# Patient Record
Sex: FEMALE | Race: WHITE | NOT HISPANIC OR LATINO | Employment: FULL TIME | URBAN - METROPOLITAN AREA
[De-identification: names, ages, dates, MRNs, and addresses within clinical notes are randomized per-mention and may not be internally consistent; named-entity substitution may affect disease eponyms.]

---

## 2019-03-28 ENCOUNTER — OFFICE VISIT (OUTPATIENT)
Dept: RHEUMATOLOGY | Facility: CLINIC | Age: 44
End: 2019-03-28
Payer: COMMERCIAL

## 2019-03-28 VITALS — SYSTOLIC BLOOD PRESSURE: 117 MMHG | DIASTOLIC BLOOD PRESSURE: 78 MMHG | WEIGHT: 141.88 LBS

## 2019-03-28 DIAGNOSIS — Z79.899 ENCOUNTER FOR LONG-TERM (CURRENT) DRUG USE: ICD-10-CM

## 2019-03-28 DIAGNOSIS — F41.8 MIXED ANXIETY DEPRESSIVE DISORDER: ICD-10-CM

## 2019-03-28 DIAGNOSIS — Z72.0 TOBACCO ABUSE: ICD-10-CM

## 2019-03-28 DIAGNOSIS — M79.7 FIBROMYALGIA: ICD-10-CM

## 2019-03-28 DIAGNOSIS — R76.8 POSITIVE ANA (ANTINUCLEAR ANTIBODY): Primary | ICD-10-CM

## 2019-03-28 LAB
ALBUMIN SERPL-MCNC: 4.3 G/DL (ref 3.1–4.7)
ALP SERPL-CCNC: 97 IU/L (ref 40–104)
ALT (SGPT): 19 IU/L (ref 3–33)
AST SERPL-CCNC: 21 IU/L (ref 10–40)
BASOPHILS NFR BLD: 0 K/UL (ref 0–0.2)
BASOPHILS NFR BLD: 0.6 %
BILIRUB SERPL-MCNC: 0.5 MG/DL (ref 0.3–1)
BUN SERPL-MCNC: 8 MG/DL (ref 8–20)
CALCIUM SERPL-MCNC: 9.1 MG/DL (ref 7.7–10.4)
CHLORIDE: 102 MMOL/L (ref 98–110)
CK SERPL-CCNC: 36 IU/L (ref 13–135)
CO2 SERPL-SCNC: 29.7 MMOL/L (ref 22.8–31.6)
CREATININE: 0.8 MG/DL (ref 0.6–1.4)
CRP SERPL-MCNC: 0.07 MG/DL (ref 0–1.4)
EOSINOPHIL NFR BLD: 0.4 K/UL (ref 0–0.7)
EOSINOPHIL NFR BLD: 7.2 %
ERYTHROCYTE [DISTWIDTH] IN BLOOD BY AUTOMATED COUNT: 13.7 % (ref 11.7–14.9)
GLUCOSE: 84 MG/DL (ref 70–99)
GRAN #: 2.9 K/UL (ref 1.4–6.5)
GRAN%: 57.5 %
HCT VFR BLD AUTO: 43.8 % (ref 36–48)
HGB BLD-MCNC: 14.8 G/DL (ref 12–15)
IMMATURE GRANS (ABS): 0 K/UL (ref 0–1)
IMMATURE GRANULOCYTES: 0.2 %
LYMPH #: 1.3 K/UL (ref 1.2–3.4)
LYMPH%: 25.7 %
MCH RBC QN AUTO: 33 PG (ref 25–35)
MCHC RBC AUTO-ENTMCNC: 33.8 G/DL (ref 31–36)
MCV RBC AUTO: 97.6 FL (ref 79–98)
MONO #: 0.4 K/UL (ref 0.1–0.6)
MONO%: 8.8 %
NUCLEATED RBCS: 0 %
PLATELET # BLD AUTO: 182 K/UL (ref 140–440)
PMV BLD AUTO: 10.7 FL (ref 8.8–12.7)
POTASSIUM SERPL-SCNC: 4.3 MMOL/L (ref 3.5–5)
PROT SERPL-MCNC: 7.5 G/DL (ref 6–8.2)
RBC # BLD AUTO: 4.49 M/UL (ref 3.5–5.5)
SODIUM: 139 MMOL/L (ref 134–144)
TSH SERPL DL<=0.005 MIU/L-ACNC: 4.46 ULU/ML (ref 0.3–5.6)
WBC # BLD AUTO: 5 K/UL (ref 5–10)

## 2019-03-28 PROCEDURE — 99205 OFFICE O/P NEW HI 60 MIN: CPT | Mod: ,,, | Performed by: INTERNAL MEDICINE

## 2019-03-28 PROCEDURE — 99205 PR OFFICE/OUTPT VISIT, NEW, LEVL V, 60-74 MIN: ICD-10-PCS | Mod: ,,, | Performed by: INTERNAL MEDICINE

## 2019-03-28 RX ORDER — LEVOTHYROXINE SODIUM 100 UG/1
TABLET ORAL
COMMUNITY

## 2019-03-28 RX ORDER — ATENOLOL 50 MG/1
TABLET ORAL
COMMUNITY

## 2019-03-28 RX ORDER — ALPRAZOLAM 0.5 MG/1
TABLET ORAL
COMMUNITY
End: 2019-09-10

## 2019-03-28 RX ORDER — BUTALBITAL, ASPIRIN, AND CAFFEINE 325; 50; 40 MG/1; MG/1; MG/1
CAPSULE ORAL
COMMUNITY

## 2019-03-28 RX ORDER — HYDROXYCHLOROQUINE SULFATE 200 MG/1
200 TABLET, FILM COATED ORAL 2 TIMES DAILY
Qty: 60 TABLET | Refills: 5 | Status: SHIPPED | OUTPATIENT
Start: 2019-03-28 | End: 2019-09-10 | Stop reason: SDUPTHER

## 2019-03-28 RX ORDER — PAROXETINE HYDROCHLORIDE 40 MG/1
TABLET, FILM COATED ORAL
COMMUNITY
End: 2019-09-10

## 2019-03-28 NOTE — PATIENT INSTRUCTIONS
Hydroxychloroquine tablets  What is this medicine?  HYDROXYCHLOROQUINE (susan drox ee KLOR oh kwin) is used to treat rheumatoid arthritis and systemic lupus erythematosus. It is also used to treat malaria.  How should I use this medicine?  Take this medicine by mouth with a glass of water. Follow the directions on the prescription label. If this medicine upsets your stomach take it with food or milk. Take your doses at regular intervals. Do not take your medicine more often than directed.  Talk to your pediatrician regarding the use of this medicine in children. Special care may be needed.  What side effects may I notice from receiving this medicine?  Side effects that you should report to your doctor or health care professional as soon as possible:  · allergic reactions like skin rash, itching or hives, swelling of the face, lips, or tongue  · change in vision  · fever, infection  · hearing loss or ringing  · muscle weakness, tremor, or numbness  · redness, blistering, peeling or loosening of the skin, including inside the mouth  · seizures  · unusual bleeding or bruising  · unusually weak or tired  Side effects that usually do not require medical attention (report to your doctor or health care professional if they continue or are bothersome):  · change in coloration of the mouth or skin  · dizziness  · hair loss, lightening  · headache  · irritability, nervousness, nightmares  · loss of appetite  · stomach upset, diarrhea  What may interact with this medicine?  · antacids  · botulinum toxins  · digoxin  · kaolin  · penicillamine  What if I miss a dose?  If you miss a dose, take it as soon as you can. If it is almost time for your next dose, take only that dose. Do not take double or extra doses.  Where should I keep my medicine?  Keep out of the reach of children. In children, this medicine can cause overdose with small doses.  Store at room temperature between 15 and 30 degrees C (59 and 86 degrees F). Protect  from moisture and light. Throw away any unused medicine after the expiration date.  What should I tell my health care provider before I take this medicine?  They need to know if you have any of these conditions:  · alcoholism  · anemia or other blood disorder  · eye disease  · glucose 6-phosphate dehydrogenase (G6PD) deficiency  · liver disease  · porphyria  · psoriasis  · an unusual or allergic reaction to chloroquine, hydroxychloroquine, other medicines, foods, dyes, or preservatives  · pregnant or trying to get pregnant  · breast-feeding  What should I watch for while using this medicine?  Visit your doctor or health care professional for regular check ups. Tell your doctor if your symptoms do not improve. Arthritis symptoms may take several weeks to improve. If you are taking this medicine for a long time, you will need important blood work done. You will also need to have your eyes checked as directed.  This medicine can make you more sensitive to the sun. Keep out of the sun. If you cannot avoid being in the sun, wear protective clothing and use sunscreen. Do not use sun lamps or tanning beds/booths.  Avoid antacids and kaolin containing products for 2 hours before and after taking a dose of this medicine.  NOTE:This sheet is a summary. It may not cover all possible information. If you have questions about this medicine, talk to your doctor, pharmacist, or health care provider. Copyright© 2017 [employer]        Lupus (Systemic Lupus Erythematosus, SLE)  Lupus is a chronic (long-term) disease. It causes inflammation in the body. It mainly affects the joints, skin, and muscles. Lupus can affect almost any part of the body, and other common sites affected by lupus include the kidneys, blood cells, lungs, brain, nerves, intestines, eyes, mouth, and heart. Lupus is an autoimmune disease. This means that immune cells in the body begin attacking normal body cells. The cause of this is not known.  Common symptoms  include:  · A butterfly-shaped rash across the bridge of the nose and cheeks or a disk-shaped rash on the face, neck, or chest  · Sun sensitivity (a short time in the sun may lead to severe sunburn or rash)  · Stiff, painful, or swollen joints (arthritis)  · Fatigue or depression  · Fever  Your healthcare provider may prescribe medicines such as oral steroids or medicines to suppress the immune system. Some people benefit from anti-malarial medicines as well. People with lupus are more likely to have heart disease. So, it is vital to manage other risk factors for heart disease. These include high blood pressure, smoking, and unhealthy cholesterol.   There is no cure for lupus. With good care, though, most people with the condition lead normal, active lives.   Home care  · If you were prescribed a medicine, take it as directed.  · Unless another pain medicine was prescribed, take an over-the-counter pain medicine such as acetaminophen or ibuprofen for pain. Do not take ibuprofen or other NSAID (non-steroidal anti-inflammatory) medicine if you were prescribed prednisone.  · Avoid sun exposure. Cover up with clothing. Wear sunglasses. Use sun screen (at least SPF 15).  · Get enough rest and reduce stress to help your immune system.  · Get some physical activity every day. This will help you feel your best.  · If you have high blood pressure, consider buying an automatic blood pressure machine (available at most pharmacies). Use this to monitor your blood pressure and report to your doctor.  · Limit alcohol intake. Eat a healthy, balanced diet low in fat and cholesterol.  · If you smoke, quit. Smoking increases the risk of lupus-related complications.  Follow-up care  Follow up with your healthcare provider or as advised by our staff.  For more information contact the Lupus Foundation at 684-617-5303  www.lupus.org  When to seek medical advice  Call your healthcare provider for any of the following:  · Increasing  weakness, fainting  · Chest pain or shortness of breath or pain with breathing  · Severe headache with fever  · Seizures  · Leg swelling, redness or tenderness (sign of blood clot)  · Unusual bruising or bleeding anywhere on your body  · Blood in your stool (black or red color)  · Abdominal pain, repeated vomiting  · Blood or development of significant bubbles in the urine  · Swelling in the legs and arms  · Development of ulcers in the mouth  · New rash  · Rashes, discoloration or ulcerations on the finger tips or toes  · You become pregnant or are planning to become pregnant  Date Last Reviewed: 3/1/2017  © 5657-2301 [employer]. [address] [place] [address], [place] [address]. All rights reserved. This information is not intended as a substitute for professional medical care. Always follow your healthcare professional's instructions.

## 2019-03-28 NOTE — PROGRESS NOTES
[facility] RHEUMATOLOGY           New patient visit    Notes dictated via Dragon to EPIC. Please forgive any unintentional errors.  Subjective:       Patient ID:   NAME: Philly Keller : 1975     43 y.o. female    Referring Doc: No ref. provider found  Other Physicians:    Chief Complaint:  Lupus        HPI:     This young woman was referred by her nurse practitioner for the evaluation of a positive ADRIENNE. The patient was seen there for a complaint of color changes in her fingers and toes and a skin rash on her chest, abdomen and legs. She describes the color change on her digits as chronic (more than 15 years). She tells me they go from normal to white, then to hues of deep red and blue, after which they return to normal. They are sometimes numb and sometimes painful. She is uncertain as to what triggers this reaction though she does recognize that cold makes it more likely to occur. She also states that she has had a skin rash for several years now mostly involving the chest and abdomen. She characterizes this as papular, erythematous, pruritic, and not responsive to oral or topical medication. She has not seen a dermatologist and has not had a skin biopsy. This eruption does not, according to her, flare after sun exposure. Additionally, she has a chronic complaint of dry eyes and dry mouth though she has not had a corneal abrasions, acute changes in vision, mouth ulcerations or enlargement of the tongue. She has advanced dental disease and has lost all of her upper teeth and the majority of the lower zone the right. She has not had a diagnosis from her dentist; she has not seen him in more than 10 years.    She also complains to generalized muscle aching and pain. This is accompanied by fatigue, difficulty concentrating, short-term memory loss, and symptoms consistent with irritable bowel syndrome.    Her past medical history is positive for chronic anxiety and depression. She has been managed with  antidepressants, most recently Paxil at 40 mg daily, along with Xanax 0.5 mg 3 times daily. She has also been diagnosed with mitral valve prolapse with occasional palpitations thought related to that. The cardiologist that she saw started her on Tenormin 50 mg daily. She has had a diagnosis of hypothyroidism and has been on low-dose thyroid replacement for many years.      ROS:   GEN:      no fever, night sweats or weight loss  SKIN:      + rashes as above, no bruising, ++ Raynauds, no photosensitivity  HEENT:  no acute changes in vision, no mouth ulcers, + sicca symptoms, no scalp tenderness, jaw claudication.  CV:         no CP, PND, HALE or orthopnea, occ palpitations  PULM:    no SOB, cough, hemoptysis, sputum or pleuritic pain  GI:          No dysphagia, + GERD, no abdominal pain, nausea, vomiting, constipation, diarrhea, melanotic stools, BRBPR, or hematemesis.  :         Hematuria- single episode with kidney stone  NEURO: no paresthesias, + migraine headaches, no visual disturbances, muscle weakness  MUSCULOSKELETAL:  No complaints of red, hot, and/or swollen joints.  PSYCH: + insomnia, ++ anxiety & depression    Medications:    Current Outpatient Medications:     ALPRAZolam (XANAX) 0.5 MG tablet, Xanax 0.5 mg tablet  Take 1 tablet every 6 hours by oral route as needed., Disp: , Rfl:     atenolol (TENORMIN) 50 MG tablet, atenolol 50 mg tablet  Take 1 tablet every day by oral route., Disp: , Rfl:     butalbital-aspirin-caffeine -40 mg (FIORINAL) -40 mg Cap, butalbital-aspirin-caffeine 50 mg-325 mg-40 mg capsule  Take 1 - 2 capsule(s) EVERY 4 HOURS by oral route as needed for headache, Disp: , Rfl:     levothyroxine (SYNTHROID) 50 MCG tablet, Synthroid 50 mcg tablet  Take 1 tablet every day by oral route before meals for 90 days., Disp: , Rfl:     paroxetine (PAXIL) 40 MG tablet, paroxetine 40 mg tablet  Take 1 tablet every day by oral route., Disp: , Rfl:     hydroxychloroquine (PLAQUENIL)  200 mg tablet, Take 1 tablet (200 mg total) by mouth 2 (two) times daily., Disp: 60 tablet, Rfl: 5    FAMILY HISTORY: negative for Connective Tissue Disease        Review of patient's allergies indicates:   Allergen Reactions    Acetaminophen     Bleach (sodium hypochlorite)     Erythromycin Nausea Only     Other reaction(s): Vomiting    Hazelnut     Hydrocodone-acetaminophen Nausea Only             Objective:     Vitals:  Blood pressure 117/78, weight 64.4 kg (141 lb 14.4 oz).    Physical Examination:   GEN:     wn/wd female in no apparent distress  SKIN:     diffuse maculopapular lesions measuring less than 1 cm found on the anterior chest and abdomen, these are slightly raised, blanching, and apparently scarring; no sclerodactyly, no active Raynaud's, no periungual erythema, no digital tip tapering, no nailbed pitting. Moderate livedo reticularis is noted on the lower extremities  HEAD:   no alopecia, no scalp tenderness, no temporal artery tenderness or induration.  EYES:   no pallor, no icterus, PERRLA  ENT:     no thrush, no mucosal dryness or ulcerations, adequate oral hygiene, poor dentition.  NECK:  supple x 6, no masses, no thyromegaly, no lymphadenopathy.  CV:        S1 and S2 regular, II/VI ejection murmur LUSB, no gallop or rub  CHEST: Normal respiratory effort; normal breath sounds/no adventitious sounds. No signs of consolidation.  ABD:      non-tender and non-distended; soft; normal bowel sounds; no rebound, guarding, or tenderness. No hepatosplenomegaly.  Musculoskeletal:  No evidence of active inflammatory arthritis or any significant degenerative change  EXTREM: no clubbing, cyanosis or edema. normal pulses.  NEURO:  grossly intact; motor/sensory WNL; no tremors  PSYCH:   Anxious, initially tremulous, able to focus, oriented x 3, not psychotic, not suicidal            Labs:   No results found for: WBC, HGB, HCT, MCV, PLTCMP@  No results found for: NA, K, CL, CO2, GLU, BUN, CREATININE,  CALCIUM, PROT, ALBUMIN, BILITOT, ALKPHOS, AST, ALT, CPK, CRP, ADRIENNE, RF, URICACID      Radiology/Diagnostic Studies:    ADRIENNE (qualitative) +. No titer or pattern. SSA & SSB +    Assessment/Discussion/Plan:   43 y.o. female with positive antinuclear antibody with skin rash, sicca symptoms, and Raynaud's- likely systemic lupus erythematosus  2) Fibromyalgia secondary to underlying anxiety-depressive disorder-treated currently with Paxil 40 mg daily and Xanax as needed    PLAN:  I have discussed the diagnosis with her in detail. I have indicated that the antinuclear antibody--even without titers--is significant given the high positivity of the anti-SSA. I have explained that the Raynaud's phenomenon is often secondary to a broader autoimmune disease such as lupus. Additionally, while I do not see findings consistent with Sjogren's, her symptoms are significant and may suggest the beginning of secondary Sjogren's. Literature on lupus was provided.    I believe it is appropriate at this time to begin treatment. I have discussed options with her and we have agreed on Plaquenil 200 mg twice daily. I have explained how this medication works and what the side effects are. I have asked her to see the ophthalmologist within the next month for a baseline examination. I provided her with printed literature on hydroxychloroquine.    I have discussed the need for sun avoidance with her. She does not tolerate sun-block well as it makes her skin burn. I suggested she try different varieties and that failing in that effort, she should use a broad brimmed hat and long sleeves and long pants to the extent possible. We also went into detail about smoking cessation. Given the risk of lung disease with lupus, it is critical that she stop smoking now.     I have obtained blood testing today including an antinuclear antibody titer and pattern. Baseline CBC and CMP were also obtained as was a urinalysis. Additionally, a CPK, CRP and TSH were  ordered.    I will not address the fibromyalgia in detail today though I have explained the diagnosis and commented generally that Paxil and Xanax are appropriate treatments. I will go more into depth at the next visit and I may consider referral to psychology.      RTC:  I will see her back in 2 months or sooner if needed      Electronically signed by Osbaldo Osborne MD

## 2019-05-23 ENCOUNTER — OFFICE VISIT (OUTPATIENT)
Dept: RHEUMATOLOGY | Facility: CLINIC | Age: 44
End: 2019-05-23
Payer: COMMERCIAL

## 2019-05-23 VITALS — SYSTOLIC BLOOD PRESSURE: 123 MMHG | DIASTOLIC BLOOD PRESSURE: 78 MMHG | WEIGHT: 142.5 LBS

## 2019-05-23 DIAGNOSIS — Z79.899 ENCOUNTER FOR LONG-TERM (CURRENT) DRUG USE: ICD-10-CM

## 2019-05-23 DIAGNOSIS — R76.8 POSITIVE ANA (ANTINUCLEAR ANTIBODY): Primary | ICD-10-CM

## 2019-05-23 DIAGNOSIS — M35.9 UNDIFFERENTIATED CONNECTIVE TISSUE DISEASE: ICD-10-CM

## 2019-05-23 DIAGNOSIS — Z72.0 TOBACCO ABUSE: ICD-10-CM

## 2019-05-23 DIAGNOSIS — M79.7 FIBROMYALGIA: ICD-10-CM

## 2019-05-23 LAB
ALBUMIN SERPL-MCNC: 4.2 G/DL (ref 3.1–4.7)
ALP SERPL-CCNC: 93 IU/L (ref 40–104)
ALT (SGPT): 14 IU/L (ref 3–33)
AST SERPL-CCNC: 21 IU/L (ref 10–40)
BASOPHILS NFR BLD: 0.1 K/UL (ref 0–0.2)
BASOPHILS NFR BLD: 0.8 %
BILIRUB SERPL-MCNC: 0.4 MG/DL (ref 0.3–1)
BUN SERPL-MCNC: 10 MG/DL (ref 8–20)
CALCIUM SERPL-MCNC: 9 MG/DL (ref 7.7–10.4)
CHLORIDE: 101 MMOL/L (ref 98–110)
CO2 SERPL-SCNC: 30 MMOL/L (ref 22.8–31.6)
CREATININE: 0.86 MG/DL (ref 0.6–1.4)
CRP SERPL-MCNC: 0.09 MG/DL (ref 0–1.4)
EOSINOPHIL NFR BLD: 0.5 K/UL (ref 0–0.7)
EOSINOPHIL NFR BLD: 8.5 %
ERYTHROCYTE [DISTWIDTH] IN BLOOD BY AUTOMATED COUNT: 13.5 % (ref 11.7–14.9)
GLUCOSE: 86 MG/DL (ref 70–99)
GRAN #: 3.6 K/UL (ref 1.4–6.5)
GRAN%: 59.9 %
HCT VFR BLD AUTO: 39.8 % (ref 36–48)
HGB BLD-MCNC: 13.5 G/DL (ref 12–15)
IMMATURE GRANS (ABS): 0 K/UL (ref 0–1)
IMMATURE GRANULOCYTES: 0.3 %
LYMPH #: 1.2 K/UL (ref 1.2–3.4)
LYMPH%: 20.8 %
MCH RBC QN AUTO: 32.6 PG (ref 25–35)
MCHC RBC AUTO-ENTMCNC: 33.9 G/DL (ref 31–36)
MCV RBC AUTO: 96.1 FL (ref 79–98)
MONO #: 0.6 K/UL (ref 0.1–0.6)
MONO%: 9.7 %
NUCLEATED RBCS: 0 %
PLATELET # BLD AUTO: 190 K/UL (ref 140–440)
PMV BLD AUTO: 10.9 FL (ref 8.8–12.7)
POTASSIUM SERPL-SCNC: 4.1 MMOL/L (ref 3.5–5)
PROT SERPL-MCNC: 7.2 G/DL (ref 6–8.2)
RBC # BLD AUTO: 4.14 M/UL (ref 3.5–5.5)
SODIUM: 138 MMOL/L (ref 134–144)
WBC # BLD AUTO: 6 K/UL (ref 5–10)

## 2019-05-23 PROCEDURE — 99214 OFFICE O/P EST MOD 30 MIN: CPT | Mod: ,,, | Performed by: INTERNAL MEDICINE

## 2019-05-23 PROCEDURE — 99214 PR OFFICE/OUTPT VISIT, EST, LEVL IV, 30-39 MIN: ICD-10-PCS | Mod: ,,, | Performed by: INTERNAL MEDICINE

## 2019-05-23 RX ORDER — IBUPROFEN 200 MG
200 TABLET ORAL EVERY 6 HOURS PRN
COMMUNITY

## 2019-05-23 RX ORDER — CYCLOBENZAPRINE HCL 10 MG
TABLET ORAL
COMMUNITY
Start: 2019-04-28 | End: 2021-06-23

## 2019-05-23 NOTE — PROGRESS NOTES
[facility] RHEUMATOLOGY           Follow-up visit    Notes dictated via Dragon to EPIC. Please forgive any unintended errors.  Subjective:       Patient ID:   NAME: Philly Keller : 1975     44 y.o. female    Referring Doc: No ref. provider found  Other Physicians:    Chief Complaint:  Positive ADRIENNE      HPI/Interval History:   The patient is doing well. She has had no difficulty tolerating hydroxychloroquine. She has had no complaint of hair loss, skin rash, photosensitivity, increased sicca symptoms, oral ulcerations, chest pain or shortness of breath, Raynaud's, or arthritis.          ROS:   GEN:    no fever, night sweats or weight loss  SKIN:   no rashes , erythema, bruising, or swelling, no Raynauds, no photosensitivity  HEENT: no changes in vision, no mouth ulcers, no sicca symptoms, no scalp tenderness, no jaw claudication.  CV:      no CP, PND, HALE or orthopnea, no palpitations  PULM: no SOB, cough, hemoptysis, sputum or pleuritic pain  GI:       no GERD, no dysphagia, no abdominal pain, nausea, vomiting, constipation, diarrhea, melanotic stools, BRBPR, or hematemesis  :      no hematuria, dysuria  NEURO: no paresthesias, headaches, acute visual disturbances  MUSCULOSKELETAL:  No muscle or joint complaints  PSYCH:   No increased insomnia, no increased anxiety or depression    Past Medical/Surgical History:  Past Medical History:   Diagnosis Date    Anxiety     Depression     Hypertension     Mitral valve prolapse     Raynaud disease     Sjogren's syndrome     Thyroid disease      Past Surgical History:   Procedure Laterality Date    MULTIPLE TOOTH EXTRACTIONS      TONSILLECTOMY      WISDOM TOOTH EXTRACTION         Allergies:  Review of patient's allergies indicates:   Allergen Reactions    Acetaminophen     Bleach (sodium hypochlorite)     Erythromycin Nausea Only     Other reaction(s): Vomiting    Hazelnut     Hydrocodone-acetaminophen Nausea Only       Social/Family History:  Social  History     Socioeconomic History    Marital status:      Spouse name: Not on file    Number of children: Not on file    Years of education: Not on file    Highest education level: Not on file   Occupational History    Not on file   Social Needs    Financial resource strain: Not on file    Food insecurity:     Worry: Not on file     Inability: Not on file    Transportation needs:     Medical: Not on file     Non-medical: Not on file   Tobacco Use    Smoking status: Current Every Day Smoker     Types: Cigarettes, Vaping w/o nicotine    Smokeless tobacco: Never Used   Substance and Sexual Activity    Alcohol use: Yes     Comment: Very Rarely    Drug use: Never    Sexual activity: Not on file   Lifestyle    Physical activity:     Days per week: Not on file     Minutes per session: Not on file    Stress: Not on file   Relationships    Social connections:     Talks on phone: Not on file     Gets together: Not on file     Attends [employer] service: Not on file     Active member of club or organization: Not on file     Attends meetings of clubs or organizations: Not on file     Relationship status: Not on file   Other Topics Concern    Not on file   Social History Narrative    Not on file     Family History   Problem Relation Age of Onset    Hypertension Mother     Mitral valve prolapse Father     No Known Problems Sister     Heart attack Brother     No Known Problems Daughter     No Known Problems Son     Diabetes Mellitus Maternal Aunt         Multiple    No Known Problems Maternal Uncle     Cancer Paternal Aunt     Cancer Paternal Uncle     Hypertension Maternal Grandmother     Alzheimer's disease Maternal Grandmother     Heart attack Maternal Grandfather     Parkinsonism Paternal Grandmother     No Known Problems Paternal Grandfather      FAMILY HISTORY: negative for Connective Tissue Disease      Medications:    Current Outpatient Medications:     ALPRAZolam (XANAX) 0.5 MG  tablet, Xanax 0.5 mg tablet  Take 1 tablet every 6 hours by oral route as needed., Disp: , Rfl:     atenolol (TENORMIN) 50 MG tablet, atenolol 50 mg tablet  Take 1 tablet every day by oral route., Disp: , Rfl:     butalbital-aspirin-caffeine -40 mg (FIORINAL) -40 mg Cap, butalbital-aspirin-caffeine 50 mg-325 mg-40 mg capsule  Take 1 - 2 capsule(s) EVERY 4 HOURS by oral route as needed for headache, Disp: , Rfl:     cyclobenzaprine (FLEXERIL) 10 MG tablet, , Disp: , Rfl:     hydroxychloroquine (PLAQUENIL) 200 mg tablet, Take 1 tablet (200 mg total) by mouth 2 (two) times daily., Disp: 60 tablet, Rfl: 5    ibuprofen (ADVIL,MOTRIN) 200 MG tablet, Take 200 mg by mouth every 6 (six) hours as needed for Pain., Disp: , Rfl:     levothyroxine (SYNTHROID) 50 MCG tablet, Synthroid 50 mcg tablet  Take 1 tablet every day by oral route before meals for 90 days., Disp: , Rfl:     paroxetine (PAXIL) 40 MG tablet, paroxetine 40 mg tablet  Take 1 tablet every day by oral route., Disp: , Rfl:         Objective:     Vitals:  Blood pressure 123/78, weight 64.6 kg (142 lb 8 oz).    Physical Examination:   GEN: wn/wd female in no apparent distress  SKIN: no rashes, no sclerodactyly, no Raynaud's, no periungual erythema, no digital tip ulcerations, no nailbed pitting  HEAD: no alopecia, no scalp tenderness, no temporal artery tenderness or induration.  EYES: no pallor, no icterus, PERRLA  ENT:  no thrush, no mucosal dryness or ulcerations, adequate oral hygiene & dentition.  NECK: supple x 6, no masses, no thyromegaly, no lymphadenopathy.  CV:   S1 and S2 regular, no murmurs, gallop or rubs  CHEST: Normal respiratory effort;  normal breath sounds/no adventitious sounds. No signs of consolidation.  ABD: non-tender and non-distended; soft; normal bowel sounds; no rebound/guarding or tenderness. No hepatosplenomegaly.  Musculoskeletal:  No evidence of inflammatory arthritis  EXTREM: no clubbing, cyanosis or edema. normal  pulses.  NEURO:  grossly intact; motor/sensory WNL; no tremors  PSYCH:   Anxious, pressured speech, able to focus, oriented x 3, non-psychotic            Labs:   Lab Results   Component Value Date    WBC 6.0 05/23/2019    HGB 13.5 05/23/2019    HCT 39.8 05/23/2019    MCV 96.1 05/23/2019     05/23/2019   CMP@  Sodium   Date Value Ref Range Status   05/23/2019 138 134 - 144 mmol/L      Potassium   Date Value Ref Range Status   05/23/2019 4.1 3.5 - 5.0 mmol/L      Chloride   Date Value Ref Range Status   05/23/2019 101 98 - 110 mmol/L      CO2   Date Value Ref Range Status   05/23/2019 30.0 22.8 - 31.6 mmol/L      Glucose   Date Value Ref Range Status   05/23/2019 86 70 - 99 mg/dL      BUN, Bld   Date Value Ref Range Status   05/23/2019 10 8 - 20 mg/dL      Creatinine   Date Value Ref Range Status   05/23/2019 0.86 0.60 - 1.40 mg/dL      Calcium   Date Value Ref Range Status   05/23/2019 9.0 7.7 - 10.4 mg/dL      Total Protein   Date Value Ref Range Status   05/23/2019 7.2 6.0 - 8.2 g/dL      Albumin   Date Value Ref Range Status   05/23/2019 4.2 3.1 - 4.7 g/dL      Total Bilirubin   Date Value Ref Range Status   05/23/2019 0.4 0.3 - 1.0 mg/dL      Alkaline Phosphatase   Date Value Ref Range Status   05/23/2019 93 40 - 104 IU/L      AST   Date Value Ref Range Status   05/23/2019 21 10 - 40 IU/L      CPK   Date Value Ref Range Status   03/28/2019 36 13 - 135 IU/L      CRP   Date Value Ref Range Status   05/23/2019 0.09 0.00 - 1.40 mg/dL          Radiology/Diagnostic Studies:    None    Assessment/Discussion/Plan:   44 y.o. female with UCTD- stable on hydroxychloroquine 400 mg daily  2) fibromyalgia secondary to underlying anxiety-depressive disorder  3) tobacco abuse    PLAN:  I will continue her hydroxychloroquine treatment. Routine follow-up blood testing was obtained.  I have reminded her that an eye examination is required within the next month for the Plaquenil examination.  Her anxiety-depressive  "medications are being handled by her primary provider and occasionally by mental health.  I have touched upon the tobacco abuse question again. She states she is "only smoking 3-4 cigarettes per day."    RTC:  I will see her back in 3 months      Electronically signed by Osblado Osborne MD                  "

## 2019-05-23 NOTE — PATIENT INSTRUCTIONS
Hydroxychloroquine tablets  What is this medicine?  HYDROXYCHLOROQUINE (susan drox ee KLOR oh kwin) is used to treat rheumatoid arthritis and systemic lupus erythematosus. It is also used to treat malaria.  How should I use this medicine?  Take this medicine by mouth with a glass of water. Follow the directions on the prescription label. If this medicine upsets your stomach take it with food or milk. Take your doses at regular intervals. Do not take your medicine more often than directed.  Talk to your pediatrician regarding the use of this medicine in children. Special care may be needed.  What side effects may I notice from receiving this medicine?  Side effects that you should report to your doctor or health care professional as soon as possible:  · allergic reactions like skin rash, itching or hives, swelling of the face, lips, or tongue  · change in vision  · fever, infection  · hearing loss or ringing  · muscle weakness, tremor, or numbness  · redness, blistering, peeling or loosening of the skin, including inside the mouth  · seizures  · unusual bleeding or bruising  · unusually weak or tired  Side effects that usually do not require medical attention (report to your doctor or health care professional if they continue or are bothersome):  · change in coloration of the mouth or skin  · dizziness  · hair loss, lightening  · headache  · irritability, nervousness, nightmares  · loss of appetite  · stomach upset, diarrhea  What may interact with this medicine?  · antacids  · botulinum toxins  · digoxin  · kaolin  · penicillamine  What if I miss a dose?  If you miss a dose, take it as soon as you can. If it is almost time for your next dose, take only that dose. Do not take double or extra doses.  Where should I keep my medicine?  Keep out of the reach of children. In children, this medicine can cause overdose with small doses.  Store at room temperature between 15 and 30 degrees C (59 and 86 degrees F). Protect  from moisture and light. Throw away any unused medicine after the expiration date.  What should I tell my health care provider before I take this medicine?  They need to know if you have any of these conditions:  · alcoholism  · anemia or other blood disorder  · eye disease  · glucose 6-phosphate dehydrogenase (G6PD) deficiency  · liver disease  · porphyria  · psoriasis  · an unusual or allergic reaction to chloroquine, hydroxychloroquine, other medicines, foods, dyes, or preservatives  · pregnant or trying to get pregnant  · breast-feeding  What should I watch for while using this medicine?  Visit your doctor or health care professional for regular check ups. Tell your doctor if your symptoms do not improve. Arthritis symptoms may take several weeks to improve. If you are taking this medicine for a long time, you will need important blood work done. You will also need to have your eyes checked as directed.  This medicine can make you more sensitive to the sun. Keep out of the sun. If you cannot avoid being in the sun, wear protective clothing and use sunscreen. Do not use sun lamps or tanning beds/booths.  Avoid antacids and kaolin containing products for 2 hours before and after taking a dose of this medicine.  NOTE:This sheet is a summary. It may not cover all possible information. If you have questions about this medicine, talk to your doctor, pharmacist, or health care provider. Copyright© 2017 [employer]

## 2019-09-10 ENCOUNTER — OFFICE VISIT (OUTPATIENT)
Dept: RHEUMATOLOGY | Facility: CLINIC | Age: 44
End: 2019-09-10
Payer: COMMERCIAL

## 2019-09-10 VITALS — DIASTOLIC BLOOD PRESSURE: 81 MMHG | WEIGHT: 149.81 LBS | SYSTOLIC BLOOD PRESSURE: 133 MMHG

## 2019-09-10 DIAGNOSIS — M35.9 UNDIFFERENTIATED CONNECTIVE TISSUE DISEASE: ICD-10-CM

## 2019-09-10 DIAGNOSIS — F41.8 MIXED ANXIETY DEPRESSIVE DISORDER: Primary | ICD-10-CM

## 2019-09-10 DIAGNOSIS — Z72.0 TOBACCO ABUSE: ICD-10-CM

## 2019-09-10 DIAGNOSIS — R76.8 POSITIVE ANA (ANTINUCLEAR ANTIBODY): ICD-10-CM

## 2019-09-10 PROCEDURE — 99213 PR OFFICE/OUTPT VISIT, EST, LEVL III, 20-29 MIN: ICD-10-PCS | Mod: S$GLB,,, | Performed by: INTERNAL MEDICINE

## 2019-09-10 PROCEDURE — 99213 OFFICE O/P EST LOW 20 MIN: CPT | Mod: S$GLB,,, | Performed by: INTERNAL MEDICINE

## 2019-09-10 RX ORDER — SERTRALINE HYDROCHLORIDE 100 MG/1
100 TABLET, FILM COATED ORAL DAILY
Refills: 3 | COMMUNITY
Start: 2019-08-22 | End: 2021-06-23

## 2019-09-10 RX ORDER — HYDROXYCHLOROQUINE SULFATE 200 MG/1
200 TABLET, FILM COATED ORAL 2 TIMES DAILY
Qty: 60 TABLET | Refills: 5 | Status: SHIPPED | OUTPATIENT
Start: 2019-09-10 | End: 2020-04-21 | Stop reason: SDUPTHER

## 2019-09-10 RX ORDER — NITROFURANTOIN (MACROCRYSTALS) 100 MG/1
CAPSULE ORAL
COMMUNITY

## 2019-09-10 RX ORDER — CLONAZEPAM 1 MG/1
1 TABLET ORAL 2 TIMES DAILY PRN
Refills: 2 | COMMUNITY
Start: 2019-08-22 | End: 2020-08-05 | Stop reason: SDUPTHER

## 2019-09-10 NOTE — PROGRESS NOTES
[facility] RHEUMATOLOGY           Follow-up visit    Notes dictated via Dragon to EPIC. Please forgive any unintended errors.  Subjective:       Patient ID:   NAME: Philly Keller : 1975     44 y.o. female    Referring Doc: No ref. provider found  Other Physicians:    Chief Complaint:  Positive ADRIENNE      HPI/Interval History:   The patient is doing reasonably well.  She has no complaints of active skin rash, increased sicca symptoms, oral ulcerations, chest pain or shortness of breath, or active Raynaud's.  She has had pain in her hands but has not had any red, hot, and swollen joints.  Her nurse practitioner has changed her antidepressant medications.  She is not certain whether this has been of benefit.  She is currently taking Zoloft 100 mg once daily and Klonopin 0.25 mg 4 times daily.  Insomnia is a problem.    ROS:   GEN:    no fever, night sweats or weight loss  SKIN:   no rashes, erythema, bruising, or swelling, no Raynauds, no photosensitivity  HEENT: no changes in vision, no mouth ulcers, no sicca symptoms, no scalp tenderness, no jaw claudication.  CV:      no CP, PND, HALE, orthopnea, no palpitations  PULM: no SOB, cough, hemoptysis, sputum or pleuritic pain  GI:       no GERD, no dysphagia, no hematemesis, no abdominal pain, nausea, vomiting, constipation, diarrhea, melanotic stools, BRBPR  :      no hematuria, dysuria  NEURO: no paresthesias, headaches, acute visual disturbances  MUSCULOSKELETAL:  Stiffness and pain in the hands without redness, warmth, or swelling  PSYCH:   + insomnia, no increased anxiety, no increased depression    Past Medical/Surgical History:  Past Medical History:   Diagnosis Date    Anxiety     Depression     Hypertension     Mitral valve prolapse     Raynaud disease     Sjogren's syndrome     Thyroid disease      Past Surgical History:   Procedure Laterality Date    MULTIPLE TOOTH EXTRACTIONS      TONSILLECTOMY      WISDOM TOOTH EXTRACTION          Allergies:  Review of patient's allergies indicates:   Allergen Reactions    Acetaminophen     Bleach (sodium hypochlorite)     Erythromycin Nausea Only     Other reaction(s): Vomiting    Hazelnut     Hydrocodone-acetaminophen Nausea Only       Social/Family History:  Social History     Socioeconomic History    Marital status:      Spouse name: Not on file    Number of children: Not on file    Years of education: Not on file    Highest education level: Not on file   Occupational History    Not on file   Social Needs    Financial resource strain: Not on file    Food insecurity:     Worry: Not on file     Inability: Not on file    Transportation needs:     Medical: Not on file     Non-medical: Not on file   Tobacco Use    Smoking status: Current Every Day Smoker     Types: Cigarettes, Vaping w/o nicotine    Smokeless tobacco: Never Used   Substance and Sexual Activity    Alcohol use: Yes     Comment: Very Rarely    Drug use: Never    Sexual activity: Not on file   Lifestyle    Physical activity:     Days per week: Not on file     Minutes per session: Not on file    Stress: Not on file   Relationships    Social connections:     Talks on phone: Not on file     Gets together: Not on file     Attends Faith service: Not on file     Active member of club or organization: Not on file     Attends meetings of clubs or organizations: Not on file     Relationship status: Not on file   Other Topics Concern    Not on file   Social History Narrative    Not on file     Family History   Problem Relation Age of Onset    Hypertension Mother     Mitral valve prolapse Father     No Known Problems Sister     Heart attack Brother     No Known Problems Daughter     No Known Problems Son     Diabetes Mellitus Maternal Aunt         Multiple    No Known Problems Maternal Uncle     Cancer Paternal Aunt     Cancer Paternal Uncle     Hypertension Maternal Grandmother     Alzheimer's disease  Maternal Grandmother     Heart attack Maternal Grandfather     Parkinsonism Paternal Grandmother     No Known Problems Paternal Grandfather      FAMILY HISTORY: negative for Connective Tissue Disease      Medications:    Current Outpatient Medications:     atenolol (TENORMIN) 50 MG tablet, atenolol 50 mg tablet  Take 1 tablet every day by oral route., Disp: , Rfl:     butalbital-aspirin-caffeine -40 mg (FIORINAL) -40 mg Cap, butalbital-aspirin-caffeine 50 mg-325 mg-40 mg capsule  Take 1 - 2 capsule(s) EVERY 4 HOURS by oral route as needed for headache, Disp: , Rfl:     clonazePAM (KLONOPIN) 1 MG tablet, Take 1 mg by mouth 2 (two) times daily as needed., Disp: , Rfl: 2    cyclobenzaprine (FLEXERIL) 10 MG tablet, , Disp: , Rfl:     hydroxychloroquine (PLAQUENIL) 200 mg tablet, Take 1 tablet (200 mg total) by mouth 2 (two) times daily., Disp: 60 tablet, Rfl: 5    ibuprofen (ADVIL,MOTRIN) 200 MG tablet, Take 200 mg by mouth every 6 (six) hours as needed for Pain., Disp: , Rfl:     levothyroxine (SYNTHROID) 50 MCG tablet, Synthroid 50 mcg tablet  Take 1 tablet every day by oral route before meals for 90 days., Disp: , Rfl:     nitrofurantoin (MACRODANTIN) 100 MG capsule, nitrofurantoin macrocrystal 100 mg capsule  Take 1 capsule every day by oral route at bedtime for 30 days., Disp: , Rfl:     sertraline (ZOLOFT) 100 MG tablet, Take 100 mg by mouth once daily., Disp: , Rfl: 3      Objective:     Vitals:  Blood pressure 133/81, weight 67.9 kg (149 lb 12.8 oz).    Physical Examination:   GEN: wn/wd female in no apparent distress  SKIN: no rashes, no sclerodactyly, no Raynaud's, no periungual erythema, no digital tip ulcerations, no nailbed pitting  HEAD: no alopecia, no scalp tenderness, no temporal artery tenderness or induration.  EYES: no pallor, no icterus, PERRLA  ENT:  no thrush, no mucosal dryness or ulcerations, adequate oral hygiene & dentition.  NECK: supple x 6, no masses, no thyromegaly,  no lymphadenopathy.  CV:   S1 and S2 regular, no murmurs, gallop or rubs  CHEST: Normal respiratory effort;  normal breath sounds/no adventitious sounds. No signs of consolidation.  ABD: non-tender and non-distended; soft; normal bowel sounds; no rebound/guarding or tenderness. No hepatosplenomegaly.  Musculoskeletal:  No evidence of active inflammatory arthritis.  No progressive deformity  EXTREM: no clubbing, cyanosis or edema. normal pulses.  NEURO:  grossly intact; motor/sensory WNL; no tremors  PSYCH:  normal mood, affect and behavior    Labs:   Lab Results   Component Value Date    WBC 6.0 05/23/2019    HGB 13.5 05/23/2019    HCT 39.8 05/23/2019    MCV 96.1 05/23/2019     05/23/2019   CMP@  Sodium   Date Value Ref Range Status   05/23/2019 138 134 - 144 mmol/L      Potassium   Date Value Ref Range Status   05/23/2019 4.1 3.5 - 5.0 mmol/L      Chloride   Date Value Ref Range Status   05/23/2019 101 98 - 110 mmol/L      CO2   Date Value Ref Range Status   05/23/2019 30.0 22.8 - 31.6 mmol/L      Glucose   Date Value Ref Range Status   05/23/2019 86 70 - 99 mg/dL      BUN, Bld   Date Value Ref Range Status   05/23/2019 10 8 - 20 mg/dL      Creatinine   Date Value Ref Range Status   05/23/2019 0.86 0.60 - 1.40 mg/dL      Calcium   Date Value Ref Range Status   05/23/2019 9.0 7.7 - 10.4 mg/dL      Total Protein   Date Value Ref Range Status   05/23/2019 7.2 6.0 - 8.2 g/dL      Albumin   Date Value Ref Range Status   05/23/2019 4.2 3.1 - 4.7 g/dL      Total Bilirubin   Date Value Ref Range Status   05/23/2019 0.4 0.3 - 1.0 mg/dL      Alkaline Phosphatase   Date Value Ref Range Status   05/23/2019 93 40 - 104 IU/L      AST   Date Value Ref Range Status   05/23/2019 21 10 - 40 IU/L      CPK   Date Value Ref Range Status   03/28/2019 36 13 - 135 IU/L      CRP   Date Value Ref Range Status   05/23/2019 0.09 0.00 - 1.40 mg/dL        Radiology/Diagnostic Studies:    None    Assessment/Discussion/Plan:   44 y.o.  female with UCTD-stable on hydroxychloroquine 400 mg daily  2) fibromyalgia secondary to underlying anxiety-depressive disorder  3) continuous tobacco abuse    PLAN:  I will continue her medication unchanged.  I rediscussed the meaning of her current UCTD diagnosis.  I also explained the role of hydroxychloroquine.  I suggested that she speak with her prescriber about her Zoloft and Klonopin.  I believe she would get a much better response to Zoloft 100 mg twice daily and to the entire 1 mg of Klonopin nightly for insomnia.  I have ordered appropriate blood and urine testing.    RTC:  Mann see her back in 4 months.    Electronically signed by Osbaldo Osborne MD

## 2020-01-09 ENCOUNTER — OFFICE VISIT (OUTPATIENT)
Dept: RHEUMATOLOGY | Facility: CLINIC | Age: 45
End: 2020-01-09
Payer: COMMERCIAL

## 2020-01-09 VITALS — DIASTOLIC BLOOD PRESSURE: 86 MMHG | SYSTOLIC BLOOD PRESSURE: 138 MMHG | WEIGHT: 144.31 LBS

## 2020-01-09 DIAGNOSIS — Z79.899 ENCOUNTER FOR LONG-TERM (CURRENT) DRUG USE: ICD-10-CM

## 2020-01-09 DIAGNOSIS — M35.9 UNDIFFERENTIATED CONNECTIVE TISSUE DISEASE: ICD-10-CM

## 2020-01-09 DIAGNOSIS — M79.7 FIBROMYALGIA: Primary | ICD-10-CM

## 2020-01-09 PROCEDURE — 99214 PR OFFICE/OUTPT VISIT, EST, LEVL IV, 30-39 MIN: ICD-10-PCS | Mod: S$GLB,,, | Performed by: INTERNAL MEDICINE

## 2020-01-09 PROCEDURE — 99214 OFFICE O/P EST MOD 30 MIN: CPT | Mod: S$GLB,,, | Performed by: INTERNAL MEDICINE

## 2020-01-09 NOTE — PROGRESS NOTES
[facility] RHEUMATOLOGY           Follow-up visit    Notes dictated to M*Modal. Please forgive any unintended errors.  Subjective:       Patient ID:   NAME: Philly Keller : 1975     44 y.o. female    Referring Doc: No ref. provider found  Other Physicians:    Chief Complaint:  Positive ADRIENNE      HPI/Interval History:  The patient is doing well.  She has no complaints of skin rash.  She has had no issues with joint pain or swelling.  She has found that having rearranged her Klonopin to 1 mg at bedtime and increasing her Zoloft to 100 twice daily as improved her moods and her sleep.  She is very pleased.    ROS:   GEN:    no fever, night sweats or weight loss  SKIN:   no rashes, erythema, bruising, or swelling, no Raynauds, no photosensitivity  HEENT: no changes in vision, no mouth ulcers, no sicca symptoms, no scalp tenderness, no jaw claudication.  CV:      no CP, PND, HALE, orthopnea, no palpitations  PULM: no SOB, cough, hemoptysis, sputum or pleuritic pain  GI:       no GERD, no dysphagia, no hematemesis, no abdominal pain, nausea, vomiting, constipation, diarrhea, melanotic stools, BRBPR  :      no hematuria, dysuria  NEURO: no paresthesias, headaches, acute visual disturbances  MUSCULOSKELETAL:  No muscle or joint pain  PSYCH:   No increased insomnia, no increased anxiety, no increased depression    Past Medical/Surgical History:  Past Medical History:   Diagnosis Date    Anxiety     Depression     Hypertension     Mitral valve prolapse     Raynaud disease     Sjogren's syndrome     Thyroid disease      Past Surgical History:   Procedure Laterality Date    MULTIPLE TOOTH EXTRACTIONS      TONSILLECTOMY      WISDOM TOOTH EXTRACTION         Allergies:  Review of patient's allergies indicates:   Allergen Reactions    Acetaminophen     Bleach (sodium hypochlorite)     Erythromycin Nausea Only     Other reaction(s): Vomiting    Hazelnut     Hydrocodone-acetaminophen Nausea Only        Social/Family History:  Social History     Socioeconomic History    Marital status:      Spouse name: Not on file    Number of children: Not on file    Years of education: Not on file    Highest education level: Not on file   Occupational History    Not on file   Social Needs    Financial resource strain: Not on file    Food insecurity:     Worry: Not on file     Inability: Not on file    Transportation needs:     Medical: Not on file     Non-medical: Not on file   Tobacco Use    Smoking status: Current Every Day Smoker     Types: Cigarettes, Vaping w/o nicotine    Smokeless tobacco: Never Used   Substance and Sexual Activity    Alcohol use: Yes     Comment: Very Rarely    Drug use: Never    Sexual activity: Not on file   Lifestyle    Physical activity:     Days per week: Not on file     Minutes per session: Not on file    Stress: Not on file   Relationships    Social connections:     Talks on phone: Not on file     Gets together: Not on file     Attends Anglican service: Not on file     Active member of club or organization: Not on file     Attends meetings of clubs or organizations: Not on file     Relationship status: Not on file   Other Topics Concern    Not on file   Social History Narrative    Not on file     Family History   Problem Relation Age of Onset    Hypertension Mother     Mitral valve prolapse Father     No Known Problems Sister     Heart attack Brother     No Known Problems Daughter     No Known Problems Son     Diabetes Mellitus Maternal Aunt         Multiple    No Known Problems Maternal Uncle     Cancer Paternal Aunt     Cancer Paternal Uncle     Hypertension Maternal Grandmother     Alzheimer's disease Maternal Grandmother     Heart attack Maternal Grandfather     Parkinsonism Paternal Grandmother     No Known Problems Paternal Grandfather      FAMILY HISTORY: negative for Connective Tissue Disease      Medications:    Current Outpatient  Medications:     atenolol (TENORMIN) 50 MG tablet, atenolol 50 mg tablet  Take 1 tablet every day by oral route., Disp: , Rfl:     butalbital-aspirin-caffeine -40 mg (FIORINAL) -40 mg Cap, butalbital-aspirin-caffeine 50 mg-325 mg-40 mg capsule  Take 1 - 2 capsule(s) EVERY 4 HOURS by oral route as needed for headache, Disp: , Rfl:     clonazePAM (KLONOPIN) 1 MG tablet, Take 1 mg by mouth 2 (two) times daily as needed., Disp: , Rfl: 2    cyclobenzaprine (FLEXERIL) 10 MG tablet, , Disp: , Rfl:     hydroxychloroquine (PLAQUENIL) 200 mg tablet, Take 1 tablet (200 mg total) by mouth 2 (two) times daily., Disp: 60 tablet, Rfl: 5    ibuprofen (ADVIL,MOTRIN) 200 MG tablet, Take 200 mg by mouth every 6 (six) hours as needed for Pain., Disp: , Rfl:     levothyroxine (SYNTHROID) 50 MCG tablet, Synthroid 50 mcg tablet  Take 1 tablet every day by oral route before meals for 90 days., Disp: , Rfl:     nitrofurantoin (MACRODANTIN) 100 MG capsule, nitrofurantoin macrocrystal 100 mg capsule  Take 1 capsule every day by oral route at bedtime for 30 days., Disp: , Rfl:     sertraline (ZOLOFT) 100 MG tablet, Take 100 mg by mouth once daily., Disp: , Rfl: 3      Objective:     Vitals:  Blood pressure 138/86, weight 65.5 kg (144 lb 4.8 oz).    Physical Examination:   GEN: wn/wd female in no apparent distress  SKIN: no rashes, no sclerodactyly, no Raynaud's, no periungual erythema, no digital tip ulcerations, no nailbed pitting  HEAD: no alopecia, no scalp tenderness, no temporal artery tenderness or induration.  EYES: no pallor, no icterus, PERRLA  ENT:  no thrush, no mucosal dryness or ulcerations, adequate oral hygiene & dentition.  NECK: supple x 6, no masses, no thyromegaly, no lymphadenopathy.  CV:   S1 and S2 regular, no murmurs, gallop or rubs  CHEST: Normal respiratory effort;  normal breath sounds/no adventitious sounds. No signs of consolidation.  ABD: non-tender and non-distended; soft; normal bowel sounds;  no rebound/guarding or tenderness. No hepatosplenomegaly.  Musculoskeletal:  No evidence of inflammatory arthritis.  No progressive deformity  EXTREM: no clubbing, cyanosis or edema. normal pulses.  NEURO:  grossly intact; motor/sensory WNL; no tremors  PSYCH:  normal mood, affect and behavior    Labs:   Lab Results   Component Value Date    WBC 6.0 05/23/2019    HGB 13.5 05/23/2019    HCT 39.8 05/23/2019    MCV 96.1 05/23/2019     05/23/2019   CMP@  Sodium   Date Value Ref Range Status   05/23/2019 138 134 - 144 mmol/L      Potassium   Date Value Ref Range Status   05/23/2019 4.1 3.5 - 5.0 mmol/L      Chloride   Date Value Ref Range Status   05/23/2019 101 98 - 110 mmol/L      CO2   Date Value Ref Range Status   05/23/2019 30.0 22.8 - 31.6 mmol/L      Glucose   Date Value Ref Range Status   05/23/2019 86 70 - 99 mg/dL      BUN, Bld   Date Value Ref Range Status   05/23/2019 10 8 - 20 mg/dL      Creatinine   Date Value Ref Range Status   05/23/2019 0.86 0.60 - 1.40 mg/dL      Calcium   Date Value Ref Range Status   05/23/2019 9.0 7.7 - 10.4 mg/dL      Total Protein   Date Value Ref Range Status   05/23/2019 7.2 6.0 - 8.2 g/dL      Albumin   Date Value Ref Range Status   05/23/2019 4.2 3.1 - 4.7 g/dL      Total Bilirubin   Date Value Ref Range Status   05/23/2019 0.4 0.3 - 1.0 mg/dL      Alkaline Phosphatase   Date Value Ref Range Status   05/23/2019 93 40 - 104 IU/L      AST   Date Value Ref Range Status   05/23/2019 21 10 - 40 IU/L      CPK   Date Value Ref Range Status   03/28/2019 36 13 - 135 IU/L      CRP   Date Value Ref Range Status   05/23/2019 0.09 0.00 - 1.40 mg/dL        Radiology/Diagnostic Studies:    None    Assessment/Discussion/Plan:   44 y.o. female with UCTD- stable on Plaquenil 400 mg daily  2) fibromyalgia-stable on Zoloft 100 mg twice daily and Klonopin 1 mg nightly    PLAN:  I will continue her medication without change.  Follow-up blood and urine testing were ordered.    RTC:  I will see  her back in 4 months    Electronically signed by Osbaldo Osborne MD

## 2020-04-20 DIAGNOSIS — R76.8 POSITIVE ANA (ANTINUCLEAR ANTIBODY): ICD-10-CM

## 2020-04-21 RX ORDER — HYDROXYCHLOROQUINE SULFATE 200 MG/1
200 TABLET, FILM COATED ORAL 2 TIMES DAILY
Qty: 60 TABLET | Refills: 5 | Status: SHIPPED | OUTPATIENT
Start: 2020-04-21 | End: 2020-11-05 | Stop reason: SDUPTHER

## 2020-06-23 ENCOUNTER — OFFICE VISIT (OUTPATIENT)
Dept: RHEUMATOLOGY | Facility: CLINIC | Age: 45
End: 2020-06-23
Payer: COMMERCIAL

## 2020-06-23 VITALS — SYSTOLIC BLOOD PRESSURE: 139 MMHG | TEMPERATURE: 98 F | DIASTOLIC BLOOD PRESSURE: 87 MMHG | WEIGHT: 137.38 LBS

## 2020-06-23 DIAGNOSIS — H91.90 HEARING LOSS, UNSPECIFIED HEARING LOSS TYPE, UNSPECIFIED LATERALITY: ICD-10-CM

## 2020-06-23 DIAGNOSIS — M35.9 UNDIFFERENTIATED CONNECTIVE TISSUE DISEASE: Primary | ICD-10-CM

## 2020-06-23 DIAGNOSIS — F41.8 MIXED ANXIETY DEPRESSIVE DISORDER: ICD-10-CM

## 2020-06-23 DIAGNOSIS — Z79.899 ENCOUNTER FOR LONG-TERM (CURRENT) DRUG USE: ICD-10-CM

## 2020-06-23 PROCEDURE — 99214 OFFICE O/P EST MOD 30 MIN: CPT | Mod: S$GLB,,, | Performed by: INTERNAL MEDICINE

## 2020-06-23 PROCEDURE — 99214 PR OFFICE/OUTPT VISIT, EST, LEVL IV, 30-39 MIN: ICD-10-PCS | Mod: S$GLB,,, | Performed by: INTERNAL MEDICINE

## 2020-06-23 NOTE — PROGRESS NOTES
[facility] RHEUMATOLOGY           Follow-up visit    Notes dictated to M*Modal. Please forgive any unintended errors.  Subjective:       Patient ID:   NAME: Philly Keller : 1975     45 y.o. female    Referring Doc: No ref. provider found  Other Physicians:    Chief Complaint:  Fibromyalgia      HPI/Interval History:  The patient is doing well.  She has no increased muscle or joint pain.  Her moods have been stable.  She is sleeping well.  She has not had skin rashes, sicca symptoms, oral ulcerations, chest pain or shortness of breath, Raynaud's, or arthritis complaints.    ROS:   GEN:    no fever, night sweats or weight loss  SKIN:   no rashes, erythema, bruising, or swelling, no Raynauds, no photosensitivity  HEENT: no changes in vision, no mouth ulcers, no sicca symptoms, no scalp tenderness, no jaw claudication.  CV:      no CP, PND, HALE, orthopnea, no palpitations  PULM: no SOB, cough, hemoptysis, sputum or pleuritic pain  GI:       no GERD, no dysphagia, no hematemesis, no abdominal pain, nausea, vomiting, constipation, diarrhea, melanotic stools, BRBPR  :      no hematuria, dysuria  NEURO: no paresthesias, headaches, acute visual disturbances  MUSCULOSKELETAL:  As above    PSYCH:   No increased insomnia, no increased anxiety, no increased depression    Past Medical/Surgical History:  Past Medical History:   Diagnosis Date    Anxiety     Depression     Hypertension     Mitral valve prolapse     Raynaud disease     Sjogren's syndrome     Thyroid disease      Past Surgical History:   Procedure Laterality Date    MULTIPLE TOOTH EXTRACTIONS      TONSILLECTOMY      WISDOM TOOTH EXTRACTION         Allergies:  Review of patient's allergies indicates:   Allergen Reactions    Acetaminophen     Bleach (sodium hypochlorite)     Erythromycin Nausea Only     Other reaction(s): Vomiting    Hazelnut     Hydrocodone-acetaminophen Nausea Only       Social/Family History:  Social History      Socioeconomic History    Marital status:      Spouse name: Not on file    Number of children: Not on file    Years of education: Not on file    Highest education level: Not on file   Occupational History    Not on file   Social Needs    Financial resource strain: Not on file    Food insecurity     Worry: Not on file     Inability: Not on file    Transportation needs     Medical: Not on file     Non-medical: Not on file   Tobacco Use    Smoking status: Current Every Day Smoker     Types: Cigarettes, Vaping w/o nicotine    Smokeless tobacco: Never Used   Substance and Sexual Activity    Alcohol use: Yes     Comment: Very Rarely    Drug use: Never    Sexual activity: Not on file   Lifestyle    Physical activity     Days per week: Not on file     Minutes per session: Not on file    Stress: Not on file   Relationships    Social connections     Talks on phone: Not on file     Gets together: Not on file     Attends Hinduism service: Not on file     Active member of club or organization: Not on file     Attends meetings of clubs or organizations: Not on file     Relationship status: Not on file   Other Topics Concern    Not on file   Social History Narrative    Not on file     Family History   Problem Relation Age of Onset    Hypertension Mother     Mitral valve prolapse Father     No Known Problems Sister     Heart attack Brother     No Known Problems Daughter     No Known Problems Son     Diabetes Mellitus Maternal Aunt         Multiple    No Known Problems Maternal Uncle     Cancer Paternal Aunt     Cancer Paternal Uncle     Hypertension Maternal Grandmother     Alzheimer's disease Maternal Grandmother     Heart attack Maternal Grandfather     Parkinsonism Paternal Grandmother     No Known Problems Paternal Grandfather      FAMILY HISTORY: negative for Connective Tissue Disease      Medications:    Current Outpatient Medications:     atenolol (TENORMIN) 50 MG tablet, atenolol  50 mg tablet  Take 1 tablet every day by oral route., Disp: , Rfl:     butalbital-aspirin-caffeine -40 mg (FIORINAL) -40 mg Cap, butalbital-aspirin-caffeine 50 mg-325 mg-40 mg capsule  Take 1 - 2 capsule(s) EVERY 4 HOURS by oral route as needed for headache, Disp: , Rfl:     clonazePAM (KLONOPIN) 1 MG tablet, Take 1 mg by mouth 2 (two) times daily as needed., Disp: , Rfl: 2    cyclobenzaprine (FLEXERIL) 10 MG tablet, , Disp: , Rfl:     hydroxychloroquine (PLAQUENIL) 200 mg tablet, Take 1 tablet (200 mg total) by mouth 2 (two) times daily., Disp: 60 tablet, Rfl: 5    ibuprofen (ADVIL,MOTRIN) 200 MG tablet, Take 200 mg by mouth every 6 (six) hours as needed for Pain., Disp: , Rfl:     levothyroxine (SYNTHROID) 100 MCG tablet, , Disp: , Rfl:     nitrofurantoin (MACRODANTIN) 100 MG capsule, nitrofurantoin macrocrystal 100 mg capsule  Take 1 capsule every day by oral route at bedtime for 30 days., Disp: , Rfl:     sertraline (ZOLOFT) 100 MG tablet, Take 100 mg by mouth once daily., Disp: , Rfl: 3      Objective:     Vitals:  Blood pressure 139/87, temperature 97.9 °F (36.6 °C), weight 62.3 kg (137 lb 6.4 oz).    Physical Examination:   GEN: wn/wd female in no apparent distress  SKIN: no rashes, no sclerodactyly, no Raynaud's, no periungual erythema, no digital tip ulcerations, no nailbed pitting  HEAD: no alopecia, no scalp tenderness, no temporal artery tenderness or induration.  EYES: no pallor, no icterus, PERRLA  ENT:  no thrush, no mucosal dryness or ulcerations, adequate oral hygiene & dentition.  NECK: supple x 6, no masses, no thyromegaly, no lymphadenopathy.  CV:   S1 and S2 regular, no murmurs, gallop or rubs  CHEST: Normal respiratory effort;  normal breath sounds/no adventitious sounds. No signs of consolidation.  ABD: non-tender and non-distended; soft; normal bowel sounds; no rebound/guarding or tenderness. No hepatosplenomegaly.  Musculoskeletal:  No evidence of active inflammatory  arthritis.  No progressive deformity  EXTREM: no clubbing, cyanosis or edema. normal pulses.  NEURO:  grossly intact; motor/sensory WNL; no tremors  PSYCH:  normal mood, affect and behavior    Labs:   Lab Results   Component Value Date    WBC 6.0 05/23/2019    HGB 13.5 05/23/2019    HCT 39.8 05/23/2019    MCV 96.1 05/23/2019     05/23/2019   CMP@  Sodium   Date Value Ref Range Status   05/23/2019 138 134 - 144 mmol/L      Potassium   Date Value Ref Range Status   05/23/2019 4.1 3.5 - 5.0 mmol/L      Chloride   Date Value Ref Range Status   05/23/2019 101 98 - 110 mmol/L      CO2   Date Value Ref Range Status   05/23/2019 30.0 22.8 - 31.6 mmol/L      Glucose   Date Value Ref Range Status   05/23/2019 86 70 - 99 mg/dL      BUN, Bld   Date Value Ref Range Status   05/23/2019 10 8 - 20 mg/dL      Creatinine   Date Value Ref Range Status   05/23/2019 0.86 0.60 - 1.40 mg/dL      Calcium   Date Value Ref Range Status   05/23/2019 9.0 7.7 - 10.4 mg/dL      Total Protein   Date Value Ref Range Status   05/23/2019 7.2 6.0 - 8.2 g/dL      Albumin   Date Value Ref Range Status   05/23/2019 4.2 3.1 - 4.7 g/dL      Total Bilirubin   Date Value Ref Range Status   05/23/2019 0.4 0.3 - 1.0 mg/dL      Alkaline Phosphatase   Date Value Ref Range Status   05/23/2019 93 40 - 104 IU/L      AST   Date Value Ref Range Status   05/23/2019 21 10 - 40 IU/L      CPK   Date Value Ref Range Status   03/28/2019 36 13 - 135 IU/L      CRP   Date Value Ref Range Status   05/23/2019 0.09 0.00 - 1.40 mg/dL        Radiology/Diagnostic Studies:    None    Assessment/Discussion/Plan:   45 y.o. female with undifferentiated connective tissue disease-stable on hydroxychloroquine 400 mg daily  2) fibromyalgia-stable on Zoloft 100 mg twice daily and Klonopin 1 mg nightly    PLAN:  I will continue her medications without change.  Routine blood and urine testing were ordered.    RTC:  I will see her back in 4 months    Electronically signed by Osbaldo JONES  MD Dylon

## 2020-08-05 DIAGNOSIS — F41.9 ANXIETY: Primary | ICD-10-CM

## 2020-08-05 RX ORDER — CLONAZEPAM 1 MG/1
1 TABLET ORAL 2 TIMES DAILY PRN
Qty: 60 TABLET | Refills: 2 | Status: SHIPPED | OUTPATIENT
Start: 2020-08-05 | End: 2020-11-05 | Stop reason: SDUPTHER

## 2020-11-05 DIAGNOSIS — R76.8 POSITIVE ANA (ANTINUCLEAR ANTIBODY): ICD-10-CM

## 2020-11-05 DIAGNOSIS — F41.9 ANXIETY: ICD-10-CM

## 2020-11-05 RX ORDER — CLONAZEPAM 1 MG/1
1 TABLET ORAL 2 TIMES DAILY PRN
Qty: 60 TABLET | Refills: 2 | Status: SHIPPED | OUTPATIENT
Start: 2020-11-05 | End: 2021-02-04

## 2020-11-05 RX ORDER — HYDROXYCHLOROQUINE SULFATE 200 MG/1
200 TABLET, FILM COATED ORAL 2 TIMES DAILY
Qty: 60 TABLET | Refills: 5 | Status: SHIPPED | OUTPATIENT
Start: 2020-11-05 | End: 2021-06-23 | Stop reason: SDUPTHER

## 2020-11-30 ENCOUNTER — PATIENT MESSAGE (OUTPATIENT)
Dept: RHEUMATOLOGY | Facility: CLINIC | Age: 45
End: 2020-11-30

## 2020-12-07 ENCOUNTER — LAB VISIT (OUTPATIENT)
Dept: LAB | Facility: HOSPITAL | Age: 45
End: 2020-12-07
Attending: INTERNAL MEDICINE
Payer: COMMERCIAL

## 2020-12-07 DIAGNOSIS — M35.9 UNDIFFERENTIATED CONNECTIVE TISSUE DISEASE: ICD-10-CM

## 2020-12-07 DIAGNOSIS — Z79.899 ENCOUNTER FOR LONG-TERM (CURRENT) DRUG USE: ICD-10-CM

## 2020-12-07 LAB
BACTERIA #/AREA URNS HPF: ABNORMAL /HPF
BASOPHILS # BLD AUTO: 0.1 K/UL (ref 0–0.2)
BASOPHILS NFR BLD: 1.4 % (ref 0–1.9)
BILIRUB UR QL STRIP: NEGATIVE
CLARITY UR: CLEAR
COLOR UR: YELLOW
DIFFERENTIAL METHOD: ABNORMAL
EOSINOPHIL # BLD AUTO: 0.5 K/UL (ref 0–0.5)
EOSINOPHIL NFR BLD: 7.1 % (ref 0–8)
ERYTHROCYTE [DISTWIDTH] IN BLOOD BY AUTOMATED COUNT: 17.7 % (ref 11.5–14.5)
GLUCOSE UR QL STRIP: NEGATIVE
HCT VFR BLD AUTO: 40.7 % (ref 37–48.5)
HGB BLD-MCNC: 13.8 G/DL (ref 12–16)
HGB UR QL STRIP: NEGATIVE
HYALINE CASTS #/AREA URNS LPF: 6 /LPF
IMM GRANULOCYTES # BLD AUTO: 0.03 K/UL (ref 0–0.04)
IMM GRANULOCYTES NFR BLD AUTO: 0.4 % (ref 0–0.5)
KETONES UR QL STRIP: NEGATIVE
LEUKOCYTE ESTERASE UR QL STRIP: ABNORMAL
LYMPHOCYTES # BLD AUTO: 1.6 K/UL (ref 1–4.8)
LYMPHOCYTES NFR BLD: 22.4 % (ref 18–48)
MCH RBC QN AUTO: 34.1 PG (ref 27–31)
MCHC RBC AUTO-ENTMCNC: 33.9 G/DL (ref 32–36)
MCV RBC AUTO: 101 FL (ref 82–98)
MICROSCOPIC COMMENT: ABNORMAL
MONOCYTES # BLD AUTO: 0.6 K/UL (ref 0.3–1)
MONOCYTES NFR BLD: 8.8 % (ref 4–15)
NEUTROPHILS # BLD AUTO: 4.3 K/UL (ref 1.8–7.7)
NEUTROPHILS NFR BLD: 59.9 % (ref 38–73)
NITRITE UR QL STRIP: POSITIVE
NRBC BLD-RTO: 0 /100 WBC
PH UR STRIP: 6 [PH] (ref 5–8)
PLATELET # BLD AUTO: 197 K/UL (ref 150–350)
PMV BLD AUTO: 10.2 FL (ref 9.2–12.9)
PROT UR QL STRIP: NEGATIVE
RBC # BLD AUTO: 4.05 M/UL (ref 4–5.4)
RBC #/AREA URNS HPF: 1 /HPF (ref 0–4)
SP GR UR STRIP: 1.01 (ref 1–1.03)
SQUAMOUS #/AREA URNS HPF: 3 /HPF
URN SPEC COLLECT METH UR: ABNORMAL
UROBILINOGEN UR STRIP-ACNC: NEGATIVE EU/DL
WBC # BLD AUTO: 7.18 K/UL (ref 3.9–12.7)
WBC #/AREA URNS HPF: 6 /HPF (ref 0–5)

## 2020-12-07 PROCEDURE — 85025 COMPLETE CBC W/AUTO DIFF WBC: CPT

## 2020-12-07 PROCEDURE — 36415 COLL VENOUS BLD VENIPUNCTURE: CPT

## 2020-12-07 PROCEDURE — 81001 URINALYSIS AUTO W/SCOPE: CPT

## 2020-12-10 ENCOUNTER — OFFICE VISIT (OUTPATIENT)
Dept: RHEUMATOLOGY | Facility: CLINIC | Age: 45
End: 2020-12-10
Payer: COMMERCIAL

## 2020-12-10 VITALS — DIASTOLIC BLOOD PRESSURE: 80 MMHG | WEIGHT: 123.38 LBS | SYSTOLIC BLOOD PRESSURE: 149 MMHG | TEMPERATURE: 98 F

## 2020-12-10 DIAGNOSIS — Z79.899 ENCOUNTER FOR LONG-TERM (CURRENT) DRUG USE: ICD-10-CM

## 2020-12-10 DIAGNOSIS — N39.0 URINARY TRACT INFECTION WITHOUT HEMATURIA, SITE UNSPECIFIED: ICD-10-CM

## 2020-12-10 DIAGNOSIS — H91.93 HEARING DECREASED, BILATERAL: ICD-10-CM

## 2020-12-10 DIAGNOSIS — M35.9 UNDIFFERENTIATED CONNECTIVE TISSUE DISEASE: Primary | ICD-10-CM

## 2020-12-10 DIAGNOSIS — L30.9 ECZEMA, UNSPECIFIED TYPE: ICD-10-CM

## 2020-12-10 DIAGNOSIS — M79.7 FIBROMYALGIA: ICD-10-CM

## 2020-12-10 PROCEDURE — 99214 OFFICE O/P EST MOD 30 MIN: CPT | Mod: S$GLB,,, | Performed by: INTERNAL MEDICINE

## 2020-12-10 PROCEDURE — 1125F PR PAIN SEVERITY QUANTIFIED, PAIN PRESENT: ICD-10-PCS | Mod: S$GLB,,, | Performed by: INTERNAL MEDICINE

## 2020-12-10 PROCEDURE — 1125F AMNT PAIN NOTED PAIN PRSNT: CPT | Mod: S$GLB,,, | Performed by: INTERNAL MEDICINE

## 2020-12-10 PROCEDURE — 99214 PR OFFICE/OUTPT VISIT, EST, LEVL IV, 30-39 MIN: ICD-10-PCS | Mod: S$GLB,,, | Performed by: INTERNAL MEDICINE

## 2020-12-10 RX ORDER — SULFAMETHOXAZOLE AND TRIMETHOPRIM 800; 160 MG/1; MG/1
1 TABLET ORAL 2 TIMES DAILY
Qty: 10 TABLET | Refills: 1 | Status: SHIPPED | OUTPATIENT
Start: 2020-12-10 | End: 2021-06-23

## 2020-12-10 NOTE — PROGRESS NOTES
"      [facility]           Follow-up visit    Notes dictated to M*Modal. Please forgive any unintended errors.  Subjective:       Patient ID:   NAME: Philly Keller : 1975     45 y.o. female    Referring Doc: No ref. provider found  Other Physicians:    Chief Complaint:  Undifferentiated connective tissue disease      HPI/Interval History:  The patient has noted some dry, "bumpy" skin rash arms and back.  She deals with this regularly in the colder weather.  She has not noted any increased sicca symptoms this to tends to worsen winter.    ROS:   GEN:    no fever, night sweats or weight loss  SKIN:   + rashes, no bruising, or swelling, no Raynauds, no photosensitivity  HEENT: no changes in vision, no mouth ulcers, no increased sicca symptoms, no scalp tenderness, no jaw claudication.  CV:      no CP, PND, HALE, orthopnea, no palpitations  PULM: no SOB, cough, hemoptysis, sputum or pleuritic pain  GI:        no dysphagia, no GERD, no hematemesis, no abdominal pain, nausea, vomiting, constipation, diarrhea, melanotic stools, BRBPR  :      no hematuria, + dysuria  NEURO: no paresthesias, headaches, acute visual disturbances  MUSCULOSKELETAL:  No red, hot, and/or swollen joints  PSYCH:   No increased insomnia, no increased anxiety, no increased depression    Past Medical/Surgical History:  Past Medical History:   Diagnosis Date    Anxiety     Depression     Hypertension     Mitral valve prolapse     Raynaud disease     Sjogren's syndrome     Thyroid disease      Past Surgical History:   Procedure Laterality Date    MULTIPLE TOOTH EXTRACTIONS      TONSILLECTOMY      WISDOM TOOTH EXTRACTION         Allergies:  Review of patient's allergies indicates:   Allergen Reactions    Acetaminophen     Bleach (sodium hypochlorite)     Erythromycin Nausea Only     Other reaction(s): Vomiting    Hazelnut     Hydrocodone-acetaminophen Nausea Only       Social/Family History:  Social History "     Socioeconomic History    Marital status:      Spouse name: Not on file    Number of children: Not on file    Years of education: Not on file    Highest education level: Not on file   Occupational History    Not on file   Social Needs    Financial resource strain: Not on file    Food insecurity     Worry: Not on file     Inability: Not on file    Transportation needs     Medical: Not on file     Non-medical: Not on file   Tobacco Use    Smoking status: Current Every Day Smoker     Types: Cigarettes, Vaping w/o nicotine    Smokeless tobacco: Never Used   Substance and Sexual Activity    Alcohol use: Yes     Comment: Very Rarely    Drug use: Never    Sexual activity: Not on file   Lifestyle    Physical activity     Days per week: Not on file     Minutes per session: Not on file    Stress: Not on file   Relationships    Social connections     Talks on phone: Not on file     Gets together: Not on file     Attends Uatsdin service: Not on file     Active member of club or organization: Not on file     Attends meetings of clubs or organizations: Not on file     Relationship status: Not on file   Other Topics Concern    Not on file   Social History Narrative    Not on file     Family History   Problem Relation Age of Onset    Hypertension Mother     Mitral valve prolapse Father     No Known Problems Sister     Heart attack Brother     No Known Problems Daughter     No Known Problems Son     Diabetes Mellitus Maternal Aunt         Multiple    No Known Problems Maternal Uncle     Cancer Paternal Aunt     Cancer Paternal Uncle     Hypertension Maternal Grandmother     Alzheimer's disease Maternal Grandmother     Heart attack Maternal Grandfather     Parkinsonism Paternal Grandmother     No Known Problems Paternal Grandfather      FAMILY HISTORY: negative for Connective Tissue Disease      Medications:    Current Outpatient Medications:     atenolol (TENORMIN) 50 MG tablet, atenolol  50 mg tablet  Take 1 tablet every day by oral route., Disp: , Rfl:     butalbital-aspirin-caffeine -40 mg (FIORINAL) -40 mg Cap, butalbital-aspirin-caffeine 50 mg-325 mg-40 mg capsule  Take 1 - 2 capsule(s) EVERY 4 HOURS by oral route as needed for headache, Disp: , Rfl:     clonazePAM (KLONOPIN) 1 MG tablet, Take 1 tablet (1 mg total) by mouth 2 (two) times daily as needed., Disp: 60 tablet, Rfl: 2    cyclobenzaprine (FLEXERIL) 10 MG tablet, , Disp: , Rfl:     hydrOXYchloroQUINE (PLAQUENIL) 200 mg tablet, Take 1 tablet (200 mg total) by mouth 2 (two) times daily., Disp: 60 tablet, Rfl: 5    ibuprofen (ADVIL,MOTRIN) 200 MG tablet, Take 200 mg by mouth every 6 (six) hours as needed for Pain., Disp: , Rfl:     levothyroxine (SYNTHROID) 100 MCG tablet, , Disp: , Rfl:     nitrofurantoin (MACRODANTIN) 100 MG capsule, nitrofurantoin macrocrystal 100 mg capsule  Take 1 capsule every day by oral route at bedtime for 30 days., Disp: , Rfl:     sertraline (ZOLOFT) 100 MG tablet, Take 100 mg by mouth once daily., Disp: , Rfl: 3    sulfamethoxazole-trimethoprim 800-160mg (BACTRIM DS) 800-160 mg Tab, Take 1 tablet by mouth 2 (two) times daily. HOLD NITROFURANTOIN, Disp: 10 tablet, Rfl: 1      Objective:     Vitals:  Blood pressure (!) 149/80, temperature 97.7 °F (36.5 °C), weight 56 kg (123 lb 6.4 oz).    Physical Examination:   GEN: wn/wd female in no apparent distress  SKIN:  Fine sandpaper-like eruption noted on the neck, chest, upper back and anterior forearms, no sclerodactyly, no Raynaud's, no periungual erythema, no digital tip ulcerations, no nailbed pitting  HEAD: no alopecia, no scalp tenderness, no temporal artery tenderness or induration.  EYES: no pallor, no icterus, PERRLA  ENT:  no thrush, no mucosal dryness or ulcerations, adequate oral hygiene & dentition.  NECK: supple x 6, no masses, no thyromegaly, no lymphadenopathy.  CV:   S1 and S2 regular, no murmurs, gallop or rubs  CHEST: Normal  respiratory effort;  normal breath sounds/no adventitious sounds. No signs of consolidation.  ABD: non-tender and non-distended; soft; normal bowel sounds; no rebound/guarding or tenderness. No hepatosplenomegaly.  Musculoskeletal:  No evidence of inflammatory disease.  No progressive deformity  EXTREM: no clubbing, cyanosis or edema. normal pulses.  NEURO:  grossly intact; motor/sensory WNL; no tremors  PSYCH:  normal mood, affect and behavior    Labs:   Lab Results   Component Value Date    WBC 7.18 12/07/2020    HGB 13.8 12/07/2020    HCT 40.7 12/07/2020     (H) 12/07/2020     12/07/2020   CMP@  Sodium   Date Value Ref Range Status   05/23/2019 138 134 - 144 mmol/L      Potassium   Date Value Ref Range Status   05/23/2019 4.1 3.5 - 5.0 mmol/L      Chloride   Date Value Ref Range Status   05/23/2019 101 98 - 110 mmol/L      CO2   Date Value Ref Range Status   05/23/2019 30.0 22.8 - 31.6 mmol/L      Glucose   Date Value Ref Range Status   05/23/2019 86 70 - 99 mg/dL      BUN   Date Value Ref Range Status   05/23/2019 10 8 - 20 mg/dL      Creatinine   Date Value Ref Range Status   05/23/2019 0.86 0.60 - 1.40 mg/dL      Calcium   Date Value Ref Range Status   05/23/2019 9.0 7.7 - 10.4 mg/dL      Total Protein   Date Value Ref Range Status   05/23/2019 7.2 6.0 - 8.2 g/dL      Albumin   Date Value Ref Range Status   05/23/2019 4.2 3.1 - 4.7 g/dL      Total Bilirubin   Date Value Ref Range Status   05/23/2019 0.4 0.3 - 1.0 mg/dL      Alkaline Phosphatase   Date Value Ref Range Status   05/23/2019 93 40 - 104 IU/L      AST   Date Value Ref Range Status   05/23/2019 21 10 - 40 IU/L      CPK   Date Value Ref Range Status   03/28/2019 36 13 - 135 IU/L      CRP   Date Value Ref Range Status   05/23/2019 0.09 0.00 - 1.40 mg/dL        Radiology/Diagnostic Studies:    None    Assessment/Discussion/Plan:   45 y.o. female with UCTD- stable on Plaquenil 400 mg daily  2) skin rash-clinically consistent with eczema  3)  UTI, recurrent, while on nitrofurantoin nightly  4) fibromyalgia with underlying anxiety depressive disorder-stable on sertraline 100 mg twice daily and Klonopin 1 mg nightly    PLAN:  I will continue her medication without change.  I have given her 5 days of Bactrim DS to take for her present UTI.  I advised her to stop nitrofurantoin while taking Bactrim.  She has a in appointment scheduled with her new primary care doctor.  She will discuss with that provider what prophylactic antibiotics and on what schedule she should take.    With regard to her eczema, I have recommended approved hydration, topical moisturizers, and a humidifier for her bedroom.    RTC:  I will see her back in 4 months or sooner if needed    Electronically signed by Osbaldo Osborne MD                    Answers for HPI/ROS submitted by the patient on 12/7/2020   fever: No  eye redness: No  mouth sores: No  headaches: Yes  shortness of breath: No  chest pain: No  trouble swallowing: No  diarrhea: Yes  constipation: No  unexpected weight change: Yes  genital sore: No  dysuria: No  During the last 3 days, have you had a skin rash?: Yes  Bruises or bleeds easily: Yes  cough: No

## 2021-02-04 ENCOUNTER — PATIENT MESSAGE (OUTPATIENT)
Dept: RHEUMATOLOGY | Facility: CLINIC | Age: 46
End: 2021-02-04

## 2021-03-04 ENCOUNTER — PATIENT MESSAGE (OUTPATIENT)
Dept: RHEUMATOLOGY | Facility: CLINIC | Age: 46
End: 2021-03-04

## 2021-03-04 DIAGNOSIS — F41.9 ANXIETY: ICD-10-CM

## 2021-03-04 RX ORDER — CLONAZEPAM 1 MG/1
TABLET ORAL
Qty: 60 TABLET | Refills: 5 | Status: SHIPPED | OUTPATIENT
Start: 2021-03-04 | End: 2021-11-17 | Stop reason: CLARIF

## 2021-04-29 ENCOUNTER — PATIENT MESSAGE (OUTPATIENT)
Dept: RESEARCH | Facility: HOSPITAL | Age: 46
End: 2021-04-29

## 2021-05-07 DIAGNOSIS — M35.9 UNDIFFERENTIATED CONNECTIVE TISSUE DISEASE: Primary | ICD-10-CM

## 2021-05-07 DIAGNOSIS — R76.8 POSITIVE ANA (ANTINUCLEAR ANTIBODY): ICD-10-CM

## 2021-05-10 RX ORDER — HYDROXYCHLOROQUINE SULFATE 200 MG/1
200 TABLET, FILM COATED ORAL 2 TIMES DAILY
Qty: 60 TABLET | Refills: 5 | OUTPATIENT
Start: 2021-05-10

## 2021-06-23 ENCOUNTER — OFFICE VISIT (OUTPATIENT)
Dept: RHEUMATOLOGY | Facility: CLINIC | Age: 46
End: 2021-06-23
Payer: COMMERCIAL

## 2021-06-23 VITALS — WEIGHT: 126.69 LBS | DIASTOLIC BLOOD PRESSURE: 83 MMHG | SYSTOLIC BLOOD PRESSURE: 136 MMHG | HEART RATE: 69 BPM

## 2021-06-23 DIAGNOSIS — Z79.899 ENCOUNTER FOR LONG-TERM (CURRENT) DRUG USE: ICD-10-CM

## 2021-06-23 DIAGNOSIS — M79.7 FIBROMYALGIA: ICD-10-CM

## 2021-06-23 DIAGNOSIS — R76.8 POSITIVE ANA (ANTINUCLEAR ANTIBODY): ICD-10-CM

## 2021-06-23 DIAGNOSIS — F41.8 MIXED ANXIETY DEPRESSIVE DISORDER: ICD-10-CM

## 2021-06-23 DIAGNOSIS — M35.9 UNDIFFERENTIATED CONNECTIVE TISSUE DISEASE: Primary | ICD-10-CM

## 2021-06-23 PROCEDURE — 99214 PR OFFICE/OUTPT VISIT, EST, LEVL IV, 30-39 MIN: ICD-10-PCS | Mod: S$GLB,,, | Performed by: INTERNAL MEDICINE

## 2021-06-23 PROCEDURE — 99214 OFFICE O/P EST MOD 30 MIN: CPT | Mod: S$GLB,,, | Performed by: INTERNAL MEDICINE

## 2021-06-23 RX ORDER — ATORVASTATIN CALCIUM 20 MG/1
20 TABLET, FILM COATED ORAL DAILY
COMMUNITY
Start: 2021-05-28

## 2021-06-23 RX ORDER — METOPROLOL SUCCINATE 25 MG/1
TABLET, EXTENDED RELEASE ORAL
COMMUNITY
Start: 2021-06-14

## 2021-06-23 RX ORDER — HYDROXYCHLOROQUINE SULFATE 200 MG/1
200 TABLET, FILM COATED ORAL 2 TIMES DAILY
Qty: 60 TABLET | Refills: 5 | Status: SHIPPED | OUTPATIENT
Start: 2021-06-23 | End: 2022-01-05 | Stop reason: SDUPTHER

## 2021-06-23 RX ORDER — DULOXETIN HYDROCHLORIDE 60 MG/1
CAPSULE, DELAYED RELEASE ORAL
COMMUNITY
Start: 2021-06-14

## 2021-06-23 RX ORDER — DULOXETIN HYDROCHLORIDE 30 MG/1
CAPSULE, DELAYED RELEASE ORAL
COMMUNITY
Start: 2021-06-14

## 2021-07-14 ENCOUNTER — LAB VISIT (OUTPATIENT)
Dept: LAB | Facility: HOSPITAL | Age: 46
End: 2021-07-14
Attending: INTERNAL MEDICINE
Payer: COMMERCIAL

## 2021-07-14 DIAGNOSIS — Z79.899 ENCOUNTER FOR LONG-TERM (CURRENT) DRUG USE: ICD-10-CM

## 2021-07-14 DIAGNOSIS — M35.9 UNDIFFERENTIATED CONNECTIVE TISSUE DISEASE: Primary | ICD-10-CM

## 2021-07-14 LAB
ALBUMIN SERPL BCP-MCNC: 4.1 G/DL (ref 3.5–5.2)
ALP SERPL-CCNC: 85 U/L (ref 55–135)
ALT SERPL W/O P-5'-P-CCNC: 10 U/L (ref 10–44)
ANION GAP SERPL CALC-SCNC: 7 MMOL/L (ref 8–16)
AST SERPL-CCNC: 12 U/L (ref 10–40)
BACTERIA #/AREA URNS HPF: NEGATIVE /HPF
BILIRUB SERPL-MCNC: 0.5 MG/DL (ref 0.1–1)
BILIRUB UR QL STRIP: NEGATIVE
BUN SERPL-MCNC: 6 MG/DL (ref 6–20)
CALCIUM SERPL-MCNC: 8.7 MG/DL (ref 8.7–10.5)
CHLORIDE SERPL-SCNC: 101 MMOL/L (ref 95–110)
CLARITY UR: CLEAR
CO2 SERPL-SCNC: 28 MMOL/L (ref 23–29)
COLOR UR: YELLOW
CREAT SERPL-MCNC: 0.8 MG/DL (ref 0.5–1.4)
CRP SERPL-MCNC: 0.07 MG/DL
ERYTHROCYTE [DISTWIDTH] IN BLOOD BY AUTOMATED COUNT: 14.6 % (ref 11.5–14.5)
EST. GFR  (AFRICAN AMERICAN): >60 ML/MIN/1.73 M^2
EST. GFR  (NON AFRICAN AMERICAN): >60 ML/MIN/1.73 M^2
GLUCOSE SERPL-MCNC: 88 MG/DL (ref 70–110)
GLUCOSE UR QL STRIP: NEGATIVE
HCT VFR BLD AUTO: 42.3 % (ref 37–48.5)
HGB BLD-MCNC: 14.7 G/DL (ref 12–16)
HGB UR QL STRIP: NEGATIVE
HYALINE CASTS #/AREA URNS LPF: 3 /LPF
KETONES UR QL STRIP: NEGATIVE
LEUKOCYTE ESTERASE UR QL STRIP: ABNORMAL
MCH RBC QN AUTO: 33 PG (ref 27–31)
MCHC RBC AUTO-ENTMCNC: 34.8 G/DL (ref 32–36)
MCV RBC AUTO: 95 FL (ref 82–98)
MICROSCOPIC COMMENT: ABNORMAL
NITRITE UR QL STRIP: NEGATIVE
PH UR STRIP: 7 [PH] (ref 5–8)
PLATELET # BLD AUTO: 184 K/UL (ref 150–450)
PMV BLD AUTO: 10.1 FL (ref 9.2–12.9)
POTASSIUM SERPL-SCNC: 4.4 MMOL/L (ref 3.5–5.1)
PROT SERPL-MCNC: 6.9 G/DL (ref 6–8.4)
PROT UR QL STRIP: NEGATIVE
RBC # BLD AUTO: 4.46 M/UL (ref 4–5.4)
RBC #/AREA URNS HPF: 2 /HPF (ref 0–4)
SODIUM SERPL-SCNC: 136 MMOL/L (ref 136–145)
SP GR UR STRIP: 1 (ref 1–1.03)
SQUAMOUS #/AREA URNS HPF: 1 /HPF
URN SPEC COLLECT METH UR: ABNORMAL
UROBILINOGEN UR STRIP-ACNC: NEGATIVE EU/DL
WBC # BLD AUTO: 5.08 K/UL (ref 3.9–12.7)
WBC #/AREA URNS HPF: 3 /HPF (ref 0–5)

## 2021-07-14 PROCEDURE — 86140 C-REACTIVE PROTEIN: CPT | Performed by: INTERNAL MEDICINE

## 2021-07-14 PROCEDURE — 80053 COMPREHEN METABOLIC PANEL: CPT | Performed by: INTERNAL MEDICINE

## 2021-07-14 PROCEDURE — 81001 URINALYSIS AUTO W/SCOPE: CPT | Performed by: INTERNAL MEDICINE

## 2021-07-14 PROCEDURE — 36415 COLL VENOUS BLD VENIPUNCTURE: CPT | Performed by: INTERNAL MEDICINE

## 2021-07-14 PROCEDURE — 85027 COMPLETE CBC AUTOMATED: CPT | Performed by: INTERNAL MEDICINE

## 2021-11-17 ENCOUNTER — OFFICE VISIT (OUTPATIENT)
Dept: RHEUMATOLOGY | Facility: CLINIC | Age: 46
End: 2021-11-17
Payer: COMMERCIAL

## 2021-11-17 VITALS — WEIGHT: 118.88 LBS | DIASTOLIC BLOOD PRESSURE: 71 MMHG | SYSTOLIC BLOOD PRESSURE: 143 MMHG

## 2021-11-17 DIAGNOSIS — M85.89 DISAPPEARING BONE DISEASE: ICD-10-CM

## 2021-11-17 DIAGNOSIS — M79.7 FIBROMYALGIA: ICD-10-CM

## 2021-11-17 DIAGNOSIS — Z79.899 ENCOUNTER FOR LONG-TERM (CURRENT) DRUG USE: ICD-10-CM

## 2021-11-17 DIAGNOSIS — F41.8 MIXED ANXIETY DEPRESSIVE DISORDER: ICD-10-CM

## 2021-11-17 DIAGNOSIS — M35.9 UNDIFFERENTIATED CONNECTIVE TISSUE DISEASE: Primary | ICD-10-CM

## 2021-11-17 PROCEDURE — 3077F SYST BP >= 140 MM HG: CPT | Mod: S$GLB,,, | Performed by: INTERNAL MEDICINE

## 2021-11-17 PROCEDURE — 3077F PR MOST RECENT SYSTOLIC BLOOD PRESSURE >= 140 MM HG: ICD-10-PCS | Mod: S$GLB,,, | Performed by: INTERNAL MEDICINE

## 2021-11-17 PROCEDURE — 1160F PR REVIEW ALL MEDS BY PRESCRIBER/CLIN PHARMACIST DOCUMENTED: ICD-10-PCS | Mod: S$GLB,,, | Performed by: INTERNAL MEDICINE

## 2021-11-17 PROCEDURE — 99214 OFFICE O/P EST MOD 30 MIN: CPT | Mod: S$GLB,,, | Performed by: INTERNAL MEDICINE

## 2021-11-17 PROCEDURE — 3078F DIAST BP <80 MM HG: CPT | Mod: S$GLB,,, | Performed by: INTERNAL MEDICINE

## 2021-11-17 PROCEDURE — 3078F PR MOST RECENT DIASTOLIC BLOOD PRESSURE < 80 MM HG: ICD-10-PCS | Mod: S$GLB,,, | Performed by: INTERNAL MEDICINE

## 2021-11-17 PROCEDURE — 99214 PR OFFICE/OUTPT VISIT, EST, LEVL IV, 30-39 MIN: ICD-10-PCS | Mod: S$GLB,,, | Performed by: INTERNAL MEDICINE

## 2021-11-17 PROCEDURE — 1160F RVW MEDS BY RX/DR IN RCRD: CPT | Mod: S$GLB,,, | Performed by: INTERNAL MEDICINE

## 2021-11-17 RX ORDER — BUSPIRONE HYDROCHLORIDE 10 MG/1
TABLET ORAL
COMMUNITY
Start: 2021-10-25

## 2023-10-03 ENCOUNTER — OFFICE VISIT (OUTPATIENT)
Dept: HEMATOLOGY/ONCOLOGY | Facility: CLINIC | Age: 48
End: 2023-10-03
Payer: COMMERCIAL

## 2023-10-03 VITALS
WEIGHT: 127.63 LBS | TEMPERATURE: 98 F | HEART RATE: 72 BPM | OXYGEN SATURATION: 99 % | DIASTOLIC BLOOD PRESSURE: 77 MMHG | SYSTOLIC BLOOD PRESSURE: 139 MMHG

## 2023-10-03 DIAGNOSIS — D72.819 LEUKOPENIA, UNSPECIFIED TYPE: ICD-10-CM

## 2023-10-03 DIAGNOSIS — D69.6 THROMBOCYTOPENIA: ICD-10-CM

## 2023-10-03 PROCEDURE — 99204 PR OFFICE/OUTPT VISIT, NEW, LEVL IV, 45-59 MIN: ICD-10-PCS | Mod: S$GLB,,, | Performed by: INTERNAL MEDICINE

## 2023-10-03 PROCEDURE — 3075F PR MOST RECENT SYSTOLIC BLOOD PRESS GE 130-139MM HG: ICD-10-PCS | Mod: CPTII,S$GLB,, | Performed by: INTERNAL MEDICINE

## 2023-10-03 PROCEDURE — 3078F DIAST BP <80 MM HG: CPT | Mod: CPTII,S$GLB,, | Performed by: INTERNAL MEDICINE

## 2023-10-03 PROCEDURE — 3078F PR MOST RECENT DIASTOLIC BLOOD PRESSURE < 80 MM HG: ICD-10-PCS | Mod: CPTII,S$GLB,, | Performed by: INTERNAL MEDICINE

## 2023-10-03 PROCEDURE — 3075F SYST BP GE 130 - 139MM HG: CPT | Mod: CPTII,S$GLB,, | Performed by: INTERNAL MEDICINE

## 2023-10-03 PROCEDURE — 1159F MED LIST DOCD IN RCRD: CPT | Mod: CPTII,S$GLB,, | Performed by: INTERNAL MEDICINE

## 2023-10-03 PROCEDURE — 1159F PR MEDICATION LIST DOCUMENTED IN MEDICAL RECORD: ICD-10-PCS | Mod: CPTII,S$GLB,, | Performed by: INTERNAL MEDICINE

## 2023-10-03 PROCEDURE — 99204 OFFICE O/P NEW MOD 45 MIN: CPT | Mod: S$GLB,,, | Performed by: INTERNAL MEDICINE

## 2023-10-03 NOTE — PROGRESS NOTES
INITIAL [facility] HEM/ONC CONSULTATION      Subjective:       Patient ID: Philly Keller is a 48 y.o. female.    Chief Complaint: No chief complaint on file.  Leucopenia and thrombocytopenia    Ms. Keller is a 49yo female with a diagnosis of CREST syndrome which was diagnosed about 3 years ago.  She is on not therapy at this time was was previously on hydroxychloroquine.      Her labs on 9/7/2023 showed a wbc of 3.0 and platelet count of 114k.  She denies any other PMH of this and review of labs in our system show normal labs between 2019 and 2021.  She was not sick or ill at the time of draw and at this time her biggest complaint is fatigue.          Past Medical History:   Diagnosis Date    Anxiety     Depression     Hypertension     Mitral valve prolapse     Raynaud disease     Sjogren's syndrome     Thyroid disease        Past Surgical History:   Procedure Laterality Date    MULTIPLE TOOTH EXTRACTIONS      TONSILLECTOMY      WISDOM TOOTH EXTRACTION         Social History     Socioeconomic History    Marital status:    Tobacco Use    Smoking status: Every Day     Types: Cigarettes, Vaping w/o nicotine    Smokeless tobacco: Never   Substance and Sexual Activity    Alcohol use: Yes     Comment: Very Rarely    Drug use: Never       Family History   Problem Relation Age of Onset    Hypertension Mother     Mitral valve prolapse Father     No Known Problems Sister     Heart attack Brother     No Known Problems Daughter     No Known Problems Son     Diabetes Mellitus Maternal Aunt         Multiple    No Known Problems Maternal Uncle     Cancer Paternal Aunt     Cancer Paternal Uncle     Hypertension Maternal Grandmother     Alzheimer's disease Maternal Grandmother     Heart attack Maternal Grandfather     Parkinsonism Paternal Grandmother     No Known Problems Paternal Grandfather        Review of patient's allergies indicates:   Allergen Reactions    Acetaminophen     Bleach (sodium hypochlorite)     Erythromycin  Nausea Only     Other reaction(s): Vomiting    Hazelnut     Hydrocodone-acetaminophen Nausea Only       Current Outpatient Medications:     atenolol (TENORMIN) 50 MG tablet, atenolol 50 mg tablet  Take 1 tablet every day by oral route., Disp: , Rfl:     atorvastatin (LIPITOR) 20 MG tablet, Take 20 mg by mouth once daily. for 90 days, Disp: , Rfl:     busPIRone (BUSPAR) 10 MG tablet, , Disp: , Rfl:     butalbital-aspirin-caffeine -40 mg (FIORINAL) -40 mg Cap, butalbital-aspirin-caffeine 50 mg-325 mg-40 mg capsule  Take 1 - 2 capsule(s) EVERY 4 HOURS by oral route as needed for headache, Disp: , Rfl:     DULoxetine (CYMBALTA) 30 MG capsule, TAKE 1 CAPSULE BY MOUTH IN THE MORNING AND 60MG AT BEDTIME, Disp: , Rfl:     DULoxetine (CYMBALTA) 60 MG capsule, TAKE 1 CAPSULE BY MOUTH AT BEDTIME AS DIRECTED. (TAKES 30MG EACH MORNING), Disp: , Rfl:     hydrOXYchloroQUINE (PLAQUENIL) 200 mg tablet, Take 1 tablet (200 mg total) by mouth 2 (two) times daily. (Patient not taking: Reported on 10/3/2023), Disp: 60 tablet, Rfl: 5    ibuprofen (ADVIL,MOTRIN) 200 MG tablet, Take 200 mg by mouth every 6 (six) hours as needed for Pain., Disp: , Rfl:     levothyroxine (SYNTHROID) 100 MCG tablet, , Disp: , Rfl:     metoprolol succinate (TOPROL-XL) 25 MG 24 hr tablet, TAKE 1 TABLET BY MOUTH EVERY DAY AT BEDTIME FOR 90 DAYS, Disp: , Rfl:     nitrofurantoin (MACRODANTIN) 100 MG capsule, nitrofurantoin macrocrystal 100 mg capsule  Take 1 capsule every day by oral route at bedtime for 30 days., Disp: , Rfl:     All medications and past history have been reviewed.    Review of Systems   Constitutional:  Negative for fever and unexpected weight change.   Respiratory:  Negative for shortness of breath.    Cardiovascular:  Negative for chest pain.   Gastrointestinal:  Negative for abdominal pain.       Objective:        /77   Pulse 72   Temp 98.4 °F (36.9 °C)   Wt 57.9 kg (127 lb 9.6 oz)   SpO2 99%     Physical  Exam  Constitutional:       Appearance: Normal appearance.   HENT:      Head: Normocephalic and atraumatic.   Eyes:      General: No scleral icterus.     Conjunctiva/sclera: Conjunctivae normal.   Cardiovascular:      Rate and Rhythm: Normal rate and regular rhythm.   Pulmonary:      Effort: Pulmonary effort is normal.      Breath sounds: Normal breath sounds.   Abdominal:      General: Abdomen is flat.   Neurological:      General: No focal deficit present.      Mental Status: She is alert and oriented to person, place, and time.   Psychiatric:         Mood and Affect: Mood normal.         Behavior: Behavior normal.         Thought Content: Thought content normal.         Judgment: Judgment normal.           Lab  No results found for this or any previous visit (from the past 336 hour(s)).  CMP  Sodium   Date Value Ref Range Status   07/14/2021 136 136 - 145 mmol/L Final   05/23/2019 138 134 - 144 mmol/L      Potassium   Date Value Ref Range Status   07/14/2021 4.4 3.5 - 5.1 mmol/L Final     Chloride   Date Value Ref Range Status   07/14/2021 101 95 - 110 mmol/L Final   05/23/2019 101 98 - 110 mmol/L      CO2   Date Value Ref Range Status   07/14/2021 28 23 - 29 mmol/L Final     Glucose   Date Value Ref Range Status   07/14/2021 88 70 - 110 mg/dL Final   05/23/2019 86 70 - 99 mg/dL      BUN   Date Value Ref Range Status   07/14/2021 6 6 - 20 mg/dL Final     Creatinine   Date Value Ref Range Status   07/14/2021 0.8 0.5 - 1.4 mg/dL Final   05/23/2019 0.86 0.60 - 1.40 mg/dL      Calcium   Date Value Ref Range Status   07/14/2021 8.7 8.7 - 10.5 mg/dL Final     Total Protein   Date Value Ref Range Status   07/14/2021 6.9 6.0 - 8.4 g/dL Final     Albumin   Date Value Ref Range Status   07/14/2021 4.1 3.5 - 5.2 g/dL Final   05/23/2019 4.2 3.1 - 4.7 g/dL      Total Bilirubin   Date Value Ref Range Status   07/14/2021 0.5 0.1 - 1.0 mg/dL Final     Comment:     For infants and newborns, interpretation of results should be  based  on gestational age, weight and in agreement with clinical  observations.    Premature Infant recommended reference ranges:  Up to 24 hours.............<8.0 mg/dL  Up to 48 hours............<12.0 mg/dL  3-5 days..................<15.0 mg/dL  6-29 days.................<15.0 mg/dL       Alkaline Phosphatase   Date Value Ref Range Status   07/14/2021 85 55 - 135 U/L Final     AST   Date Value Ref Range Status   07/14/2021 12 10 - 40 U/L Final     ALT   Date Value Ref Range Status   07/14/2021 10 10 - 44 U/L Final     Anion Gap   Date Value Ref Range Status   07/14/2021 7 (L) 8 - 16 mmol/L Final     eGFR if    Date Value Ref Range Status   07/14/2021 >60.0 >60 mL/min/1.73 m^2 Final     eGFR if non    Date Value Ref Range Status   07/14/2021 >60.0 >60 mL/min/1.73 m^2 Final     Comment:     Calculation used to obtain the estimated glomerular filtration  rate (eGFR) is the CKD-EPI equation.            Specimen (24h ago, onward)      None                  All lab results and imaging results have been reviewed and discussed with the patient.     Assessment:       1. Leukopenia, unspecified type    2. Thrombocytopenia      Problem List Items Addressed This Visit       Thrombocytopenia    Leucopenia     This is a new problem but could be related tot he crest syndrome.  Will work up for other issues or problems and see if there are any other possible explanations.  Low platelets are likely due to the same issue and will monitor this for now along with this work up.  Will have her back with me in 4 weeks to evaluate further.          Relevant Orders    CBC Auto Differential    Lactate Dehydrogenase    Fibrinogen    US Abdomen Limited    ADRIENNE Screen w/Reflex    C-Reactive Protein    HIV 1/2 Ag/Ab (4th Gen)    Rheumatoid Factor    Sedimentation rate      Cancer Staging   No matching staging information was found for the patient.      Plan:         Follow up in about 4 weeks (around 10/31/2023).        The plan was discussed with the patient and all questions/concerns have been answered to the patient's satisfaction.

## 2023-10-03 NOTE — ASSESSMENT & PLAN NOTE
This is a new problem but could be related tot he crest syndrome.  Will work up for other issues or problems and see if there are any other possible explanations.  Low platelets are likely due to the same issue and will monitor this for now along with this work up.  Will have her back with me in 4 weeks to evaluate further.

## 2023-10-16 ENCOUNTER — HOSPITAL ENCOUNTER (OUTPATIENT)
Dept: RADIOLOGY | Facility: HOSPITAL | Age: 48
Discharge: HOME | End: 2023-10-16
Attending: INTERNAL MEDICINE
Payer: COMMERCIAL

## 2023-10-16 DIAGNOSIS — D72.819 LEUKOPENIA, UNSPECIFIED TYPE: ICD-10-CM

## 2023-10-16 PROCEDURE — 76700 US EXAM ABDOM COMPLETE: CPT | Mod: TC

## 2023-11-02 ENCOUNTER — OFFICE VISIT (OUTPATIENT)
Dept: HEMATOLOGY/ONCOLOGY | Facility: CLINIC | Age: 48
End: 2023-11-02
Payer: COMMERCIAL

## 2023-11-02 VITALS
TEMPERATURE: 97 F | WEIGHT: 124.81 LBS | HEART RATE: 71 BPM | SYSTOLIC BLOOD PRESSURE: 127 MMHG | DIASTOLIC BLOOD PRESSURE: 76 MMHG | RESPIRATION RATE: 18 BRPM

## 2023-11-02 DIAGNOSIS — D72.819 LEUKOPENIA, UNSPECIFIED TYPE: ICD-10-CM

## 2023-11-02 PROCEDURE — 3074F PR MOST RECENT SYSTOLIC BLOOD PRESSURE < 130 MM HG: ICD-10-PCS | Mod: CPTII,S$GLB,, | Performed by: INTERNAL MEDICINE

## 2023-11-02 PROCEDURE — 3074F SYST BP LT 130 MM HG: CPT | Mod: CPTII,S$GLB,, | Performed by: INTERNAL MEDICINE

## 2023-11-02 PROCEDURE — 99213 OFFICE O/P EST LOW 20 MIN: CPT | Mod: S$GLB,,, | Performed by: INTERNAL MEDICINE

## 2023-11-02 PROCEDURE — 3078F DIAST BP <80 MM HG: CPT | Mod: CPTII,S$GLB,, | Performed by: INTERNAL MEDICINE

## 2023-11-02 PROCEDURE — 99213 PR OFFICE/OUTPT VISIT, EST, LEVL III, 20-29 MIN: ICD-10-PCS | Mod: S$GLB,,, | Performed by: INTERNAL MEDICINE

## 2023-11-02 PROCEDURE — 3078F PR MOST RECENT DIASTOLIC BLOOD PRESSURE < 80 MM HG: ICD-10-PCS | Mod: CPTII,S$GLB,, | Performed by: INTERNAL MEDICINE

## 2023-11-02 NOTE — PROGRESS NOTES
PROGRESS NOTE    Subjective:       Patient ID: Philly Keller is a 48 y.o. female.    Chief Complaint:  No chief complaint on file.  Leukopenia follow up.     History of Present Illness:   Philly Kellre is a 48 y.o. female who presents for follow up of above.         Family and Social history reviewed and is unchanged from previous         Current Outpatient Medications:     atenolol (TENORMIN) 50 MG tablet, atenolol 50 mg tablet  Take 1 tablet every day by oral route., Disp: , Rfl:     atorvastatin (LIPITOR) 20 MG tablet, Take 20 mg by mouth once daily. for 90 days, Disp: , Rfl:     busPIRone (BUSPAR) 10 MG tablet, , Disp: , Rfl:     butalbital-aspirin-caffeine -40 mg (FIORINAL) -40 mg Cap, butalbital-aspirin-caffeine 50 mg-325 mg-40 mg capsule  Take 1 - 2 capsule(s) EVERY 4 HOURS by oral route as needed for headache, Disp: , Rfl:     DULoxetine (CYMBALTA) 30 MG capsule, TAKE 1 CAPSULE BY MOUTH IN THE MORNING AND 60MG AT BEDTIME, Disp: , Rfl:     DULoxetine (CYMBALTA) 60 MG capsule, TAKE 1 CAPSULE BY MOUTH AT BEDTIME AS DIRECTED. (TAKES 30MG EACH MORNING), Disp: , Rfl:     hydrOXYchloroQUINE (PLAQUENIL) 200 mg tablet, Take 1 tablet (200 mg total) by mouth 2 (two) times daily., Disp: 60 tablet, Rfl: 5    ibuprofen (ADVIL,MOTRIN) 200 MG tablet, Take 200 mg by mouth every 6 (six) hours as needed for Pain., Disp: , Rfl:     levothyroxine (SYNTHROID) 100 MCG tablet, , Disp: , Rfl:     metoprolol succinate (TOPROL-XL) 25 MG 24 hr tablet, TAKE 1 TABLET BY MOUTH EVERY DAY AT BEDTIME FOR 90 DAYS, Disp: , Rfl:     nitrofurantoin (MACRODANTIN) 100 MG capsule, nitrofurantoin macrocrystal 100 mg capsule  Take 1 capsule every day by oral route at bedtime for 30 days., Disp: , Rfl:         Objective:       Physical Examination:     /76   Pulse 71   Temp 97.4 °F (36.3 °C)   Resp 18   Wt 56.6 kg (124 lb 12.8 oz)     Physical Exam  Constitutional:        Appearance: Normal appearance.   HENT:      Head: Normocephalic and atraumatic.   Eyes:      General: No scleral icterus.     Conjunctiva/sclera: Conjunctivae normal.   Cardiovascular:      Rate and Rhythm: Normal rate.   Pulmonary:      Effort: Pulmonary effort is normal.   Abdominal:      General: Abdomen is flat.   Neurological:      General: No focal deficit present.      Mental Status: She is alert and oriented to person, place, and time.   Psychiatric:         Mood and Affect: Mood normal.         Behavior: Behavior normal.         Thought Content: Thought content normal.         Judgment: Judgment normal.         Labs:   No results found for this or any previous visit (from the past 336 hour(s)).  CMP  Sodium   Date Value Ref Range Status   07/14/2021 136 136 - 145 mmol/L Final   05/23/2019 138 134 - 144 mmol/L      Potassium   Date Value Ref Range Status   07/14/2021 4.4 3.5 - 5.1 mmol/L Final     Chloride   Date Value Ref Range Status   07/14/2021 101 95 - 110 mmol/L Final   05/23/2019 101 98 - 110 mmol/L      CO2   Date Value Ref Range Status   07/14/2021 28 23 - 29 mmol/L Final     Glucose   Date Value Ref Range Status   07/14/2021 88 70 - 110 mg/dL Final   05/23/2019 86 70 - 99 mg/dL      BUN   Date Value Ref Range Status   07/14/2021 6 6 - 20 mg/dL Final     Creatinine   Date Value Ref Range Status   07/14/2021 0.8 0.5 - 1.4 mg/dL Final   05/23/2019 0.86 0.60 - 1.40 mg/dL      Calcium   Date Value Ref Range Status   07/14/2021 8.7 8.7 - 10.5 mg/dL Final     Total Protein   Date Value Ref Range Status   07/14/2021 6.9 6.0 - 8.4 g/dL Final     Albumin   Date Value Ref Range Status   07/14/2021 4.1 3.5 - 5.2 g/dL Final   05/23/2019 4.2 3.1 - 4.7 g/dL      Total Bilirubin   Date Value Ref Range Status   07/14/2021 0.5 0.1 - 1.0 mg/dL Final     Comment:     For infants and newborns, interpretation of results should be based  on gestational age, weight and in agreement with  "clinical  observations.    Premature Infant recommended reference ranges:  Up to 24 hours.............<8.0 mg/dL  Up to 48 hours............<12.0 mg/dL  3-5 days..................<15.0 mg/dL  6-29 days.................<15.0 mg/dL       Alkaline Phosphatase   Date Value Ref Range Status   07/14/2021 85 55 - 135 U/L Final     AST   Date Value Ref Range Status   07/14/2021 12 10 - 40 U/L Final     ALT   Date Value Ref Range Status   07/14/2021 10 10 - 44 U/L Final     Anion Gap   Date Value Ref Range Status   07/14/2021 7 (L) 8 - 16 mmol/L Final     eGFR if    Date Value Ref Range Status   07/14/2021 >60.0 >60 mL/min/1.73 m^2 Final     eGFR if non    Date Value Ref Range Status   07/14/2021 >60.0 >60 mL/min/1.73 m^2 Final     Comment:     Calculation used to obtain the estimated glomerular filtration  rate (eGFR) is the CKD-EPI equation.        No results found for: "CEA"  No results found for: "PSA"        Assessment/Plan:     Problem List Items Addressed This Visit       Leucopenia     Discussed this today and reviewed work up with patient.  The WBC and plts are normal now and this appears to have been a transient issue.  May be related to crest syndrome and I suspect the ADRIENNE labs are as a result of this.  She has no new symptoms at this time and is feeling ok.  Discussed that I would like her to see her rheumatologists to discuss the ADRIENNE issue but I see no other new issues.      Will see her again in six months with CBC and if this is normal then will likely be able to follow up prn.           Relevant Orders    CBC Auto Differential       Discussion:     Follow up in about 6 months (around 5/2/2024).      Electronically signed by Rey Galaviz      "

## 2023-11-02 NOTE — ASSESSMENT & PLAN NOTE
Discussed this today and reviewed work up with patient.  The WBC and plts are normal now and this appears to have been a transient issue.  May be related to crest syndrome and I suspect the ADRIENNE labs are as a result of this.  She has no new symptoms at this time and is feeling ok.  Discussed that I would like her to see her rheumatologists to discuss the ADRIENNE issue but I see no other new issues.      Will see her again in six months with CBC and if this is normal then will likely be able to follow up prn.

## 2024-05-23 ENCOUNTER — TELEPHONE (OUTPATIENT)
Facility: CLINIC | Age: 49
End: 2024-05-23
Payer: COMMERCIAL

## 2025-05-12 DIAGNOSIS — J41.0 SMOKERS' COUGH: ICD-10-CM

## 2025-05-12 DIAGNOSIS — Z00.00 ENCOUNTER FOR GENERAL ADULT MEDICAL EXAMINATION W/O ABNORMAL FINDINGS: Primary | ICD-10-CM
